# Patient Record
Sex: FEMALE | Race: BLACK OR AFRICAN AMERICAN | NOT HISPANIC OR LATINO | Employment: UNEMPLOYED | URBAN - METROPOLITAN AREA
[De-identification: names, ages, dates, MRNs, and addresses within clinical notes are randomized per-mention and may not be internally consistent; named-entity substitution may affect disease eponyms.]

---

## 2022-07-17 ENCOUNTER — APPOINTMENT (OUTPATIENT)
Dept: MRI IMAGING | Facility: HOSPITAL | Age: 27
End: 2022-07-17

## 2022-07-17 ENCOUNTER — APPOINTMENT (OUTPATIENT)
Dept: GENERAL RADIOLOGY | Facility: HOSPITAL | Age: 27
End: 2022-07-17

## 2022-07-17 ENCOUNTER — HOSPITAL ENCOUNTER (EMERGENCY)
Facility: HOSPITAL | Age: 27
Discharge: HOME | End: 2022-07-18
Attending: STUDENT IN AN ORGANIZED HEALTH CARE EDUCATION/TRAINING PROGRAM | Admitting: STUDENT IN AN ORGANIZED HEALTH CARE EDUCATION/TRAINING PROGRAM

## 2022-07-17 DIAGNOSIS — S39.012A STRAIN OF LUMBAR PARASPINOUS MUSCLE, INITIAL ENCOUNTER: ICD-10-CM

## 2022-07-17 DIAGNOSIS — M79.604 PAIN OF RIGHT LOWER EXTREMITY: ICD-10-CM

## 2022-07-17 DIAGNOSIS — M51.16 LUMBAR DISC DISEASE WITH RADICULOPATHY: Primary | ICD-10-CM

## 2022-07-17 DIAGNOSIS — S16.1XXA STRAIN OF NECK MUSCLE, INITIAL ENCOUNTER: ICD-10-CM

## 2022-07-17 PROCEDURE — 73502 X-RAY EXAM HIP UNI 2-3 VIEWS: CPT

## 2022-07-17 PROCEDURE — 72146 MRI CHEST SPINE W/O DYE: CPT

## 2022-07-17 PROCEDURE — 72141 MRI NECK SPINE W/O DYE: CPT

## 2022-07-17 PROCEDURE — 81025 URINE PREGNANCY TEST: CPT | Performed by: STUDENT IN AN ORGANIZED HEALTH CARE EDUCATION/TRAINING PROGRAM

## 2022-07-17 PROCEDURE — 72148 MRI LUMBAR SPINE W/O DYE: CPT

## 2022-07-17 PROCEDURE — 99284 EMERGENCY DEPT VISIT MOD MDM: CPT

## 2022-07-17 RX ORDER — METHOCARBAMOL 750 MG/1
750 TABLET, FILM COATED ORAL ONCE
Status: COMPLETED | OUTPATIENT
Start: 2022-07-17 | End: 2022-07-17

## 2022-07-17 RX ORDER — ACETAMINOPHEN 500 MG
1000 TABLET ORAL ONCE
Status: COMPLETED | OUTPATIENT
Start: 2022-07-17 | End: 2022-07-17

## 2022-07-17 RX ORDER — OXYCODONE HYDROCHLORIDE 5 MG/1
5 TABLET ORAL ONCE
Status: COMPLETED | OUTPATIENT
Start: 2022-07-17 | End: 2022-07-17

## 2022-07-17 RX ORDER — IBUPROFEN 800 MG/1
800 TABLET ORAL ONCE
Status: COMPLETED | OUTPATIENT
Start: 2022-07-17 | End: 2022-07-17

## 2022-07-17 RX ADMIN — ACETAMINOPHEN 1000 MG: 500 TABLET ORAL at 22:21

## 2022-07-17 RX ADMIN — IBUPROFEN 800 MG: 800 TABLET, FILM COATED ORAL at 22:21

## 2022-07-17 RX ADMIN — OXYCODONE 5 MG: 5 TABLET ORAL at 22:21

## 2022-07-17 RX ADMIN — METHOCARBAMOL 750 MG: 750 TABLET ORAL at 22:21

## 2022-07-18 VITALS
WEIGHT: 290 LBS | RESPIRATION RATE: 16 BRPM | DIASTOLIC BLOOD PRESSURE: 73 MMHG | HEART RATE: 66 BPM | SYSTOLIC BLOOD PRESSURE: 123 MMHG | TEMPERATURE: 97.3 F | OXYGEN SATURATION: 95 % | BODY MASS INDEX: 46.61 KG/M2 | HEIGHT: 66 IN

## 2022-07-18 LAB
B-HCG UR QL: NEGATIVE
EXPIRATION DATE: NORMAL
INTERNAL NEGATIVE CONTROL: NORMAL
INTERNAL POSITIVE CONTROL: NORMAL
Lab: NORMAL

## 2022-07-18 RX ORDER — GABAPENTIN 300 MG/1
300 CAPSULE ORAL 3 TIMES DAILY
Qty: 6 CAPSULE | Refills: 0 | Status: SHIPPED | OUTPATIENT
Start: 2022-07-18 | End: 2022-07-25

## 2022-07-18 RX ORDER — METHOCARBAMOL 750 MG/1
750 TABLET, FILM COATED ORAL 3 TIMES DAILY
Qty: 9 TABLET | Refills: 0 | Status: SHIPPED | OUTPATIENT
Start: 2022-07-18 | End: 2022-07-21

## 2022-07-18 RX ORDER — IBUPROFEN 600 MG/1
600 TABLET ORAL EVERY 6 HOURS PRN
Qty: 20 TABLET | Refills: 0 | Status: SHIPPED | OUTPATIENT
Start: 2022-07-18 | End: 2022-07-25

## 2022-07-18 RX ORDER — LIDOCAINE 50 MG/G
1 PATCH TOPICAL EVERY 24 HOURS
Qty: 6 PATCH | Refills: 0 | Status: SHIPPED | OUTPATIENT
Start: 2022-07-18 | End: 2022-07-25

## 2022-07-18 RX ORDER — METHYLPREDNISOLONE 4 MG/1
TABLET ORAL
Qty: 21 TABLET | Refills: 0 | Status: SHIPPED | OUTPATIENT
Start: 2022-07-18 | End: 2022-07-25

## 2022-07-18 NOTE — ED PROVIDER NOTES
EMERGENCY DEPARTMENT ENCOUNTER    Pt Name: Shonetta Davis  MRN: [id number]  Pt :   1995  Room Number:    Date of encounter:  2022  PCP: Provider, No Known  ED Provider: Luis Payne MD    Historian: Patient      HPI:  Chief Complaint: Back pain, leg pain, intermittent foot numbness        Context: Shonetta Davis is a 27-year-old female with obesity and without past medical history but also does not go to the doctor who presents because of 1 month of severe lumbar back pain radiating to her right posterior leg and knee.  She perceives it as pain being worse in the knee and is made significantly worse with movement is better with lying flat and gets worse with sitting.  She has not had numbness in the upper leg but has started having episodes of numbness in her foot and ankle particularly over the dorsum of the foot.  She denies fevers or IV drug use.  Denies any difficulty with urination or defecation.  Denies saddle anesthesia and the numbness is only in her foot.  She is also been having episodes of right neck pain and right arm numbness.  She knows of no trauma or injuries.  Has not found anything that makes her pain better.  No other complaints at this time.     PAST MEDICAL HISTORY  No past medical history on file.      PAST SURGICAL HISTORY  No past surgical history on file.      FAMILY HISTORY  No family history on file.      SOCIAL HISTORY  Social History     Socioeconomic History   • Marital status: Single         ALLERGIES  Patient has no known allergies.        REVIEW OF SYSTEMS  Review of Systems       All systems reviewed and negative except for those discussed in HPI.       PHYSICAL EXAM    I have reviewed the triage vital signs and nursing notes.    ED Triage Vitals [229]   Temp Heart Rate Resp BP SpO2   97.3 °F (36.3 °C) 103 16 (!) 144/110 98 %      Temp src Heart Rate Source Patient Position BP Location FiO2 (%)   Tympanic -- -- -- --       Physical  Exam  GENERAL:   Appears in obvious pain  HENT: Nares patent.  Full range of motion of the C-spine.  EYES: No scleral icterus.  CV: Regular rhythm, regular rate.  RESPIRATORY: Normal effort.  No audible wheezes, rales or rhonchi.  ABDOMEN: Soft, nontender  MUSCULOSKELETAL: No deformities.  Tenderness to palpation over the right lumbar paraspinal areas as well as midline in the lumbar area.  No saddle anesthesia, neurovascularly intact, positive straight leg raise on the left but not on the right, no knee pain with range of motion only pain in the back.  NEURO: Alert, moves all extremities, follows commands.  SKIN: Warm, dry, no rash visualized.        LAB RESULTS  Recent Results (from the past 24 hour(s))   POC Urine Pregnancy    Collection Time: 07/18/22  1:27 AM    Specimen: Urine   Result Value Ref Range    HCG, Urine, QL Negative Negative    Lot Number [id number]     Internal Positive Control Passed Positive, Passed    Internal Negative Control Passed Negative, Passed    Expiration Date 09/30/23        If labs were ordered, I independently reviewed the results.        RADIOLOGY  MRI Cervical Spine Without Contrast    Result Date: 7/18/2022  EXAMINATION: MRI THORACIC SPINE WO CONTRAST, MRI LUMBAR SPINE WO CONTRAST, MRI CERVICAL SPINE WO CONTRAST DATE: 7/17/2022 10:51 PM  INDICATION: Neck back and low back pain radiating to bilat lower extremities w increased severity to rt anterior thigh.  COMPARISON: None available. TECHNIQUE: Sagittal and axial noncontrast images of the cervical spine were obtained in the usual manner. FINDINGS: Cord and epidural space: No cerebellar tonsillar ectopia. Cervical cord is normal in size and signal. Vertebral column: Straightening of usual lordosis. Vertebral body heights are maintained. No concerning marrow signal. C2-C3: No significant central canal or foraminal narrowing. C3-C4: No significant central canal or foraminal narrowing. C4-C5: No significant central canal or foraminal  narrowing. C5-C6: No significant central canal or foraminal narrowing. C6-C7: No significant central canal or foraminal narrowing. C7-T1: No significant central canal or foraminal narrowing. Soft tissues: Cervical soft tissues are unremarkable. TECHNIQUE: Sagittal and axial noncontrast images of the thoracic spine were obtained in the usual manner. FINDINGS: Cord and epidural space: Thoracic cord is normal in size and signal. Vertebral column: Spinal alignment is anatomic. Vertebral body heights are maintained. No concerning marrow signal. T1-T2: There is preserved disc height. No focal disc protrusion or significant stenosis. T2-T3: There is preserved disc height. No focal disc protrusion or significant stenosis. T3-T4: There is preserved disc height. No focal disc protrusion or significant stenosis. T4-T5: There is preserved disc height. No focal disc protrusion or significant stenosis. T5-T6: There is preserved disc height. No focal disc protrusion or significant stenosis. T6-T7: There is preserved disc height. No focal disc protrusion or significant stenosis. T7-T8: There is preserved disc height. No focal disc protrusion or significant stenosis. T8-T9: There is preserved disc height. No focal disc protrusion or significant stenosis. T9-T10: There is preserved disc height. No focal disc protrusion or significant stenosis. T10-T11: There is preserved disc height. No focal disc protrusion or significant stenosis. T11-T12: There is preserved disc height. No focal disc protrusion or significant stenosis. Soft tissues: Imaged portions of the chest and upper abdomen are unremarkable. TECHNIQUE: Sagittal and axial noncontrast images of the lumbar spine were obtained in the usual manner. FINDINGS: Cord and epidural space: Conus terminates at L2. Nerve roots of the cauda equina are normal. Vertebral column: Spinal alignment is anatomic. There are five lumbar type vertebral bodies. Vertebral body heights are maintained   T1 hypointensity and STIR hyperintensity within the endplates about the L5-S1 disc space, most conspicuously involving the L5 inferior endplate; this most likely represents degenerative marrow changes.  Imaged portions of the sacrum and sacroiliac joints are within normal limits. T12-L1:  No significant central canal or foraminal narrowing. L1-L2:  No significant central canal or foraminal narrowing. L2-L3:  No significant central canal or foraminal narrowing. L3-L4:  No significant central canal or foraminal narrowing. L4-L5: No significant central canal or foraminal narrowing. L5-S1: Mild disc desiccation. Tiny central disc extrusion with inferior migration. The extruded disc extends 4 mm inferiorly beyond the superior endplate of S1. No significant spinal canal or foraminal narrowing. No significant central canal or foraminal  narrowing. Soft tissues: Imaged abdominal and retroperitoneal soft tissues are unremarkable.     MRI of the cervical spine:  1.  Normal MRI of the cervical spine.  No focal disc protrusion or significant stenosis. 2.  No cord compression or cord signal abnormality.   MRI of the thoracic spine:  1.  Normal MRI of the thoracic spine.  No focal disc protrusion or significant stenosis. 2.  No cord compression or cord signal abnormality.   MRI of the lumbar spine:  1.  Mild degenerative disc disease at L5-S1 mild disc desiccation and a tiny central extrusion without significant spinal canal or foraminal narrowing. 2.  Mild marrow signal abnormality about the L5-S1 disc space is most likely degenerative, however, very early discitis osteomyelitis could have a similar appearance. If there is further clinical concern for infection, suggest follow-up MRI of the lumbar  spine with and without contrast in 6-8 weeks. 3.  The remainder of the lumbar spine essentially normal. 4.  No compression of the cauda equina.   Electronically signed by:  Brian Barrera DO  7/17/2022 10:12 PM Mountain Time    MRI  Thoracic Spine Without Contrast    Result Date: 7/18/2022  EXAMINATION: MRI THORACIC SPINE WO CONTRAST, MRI LUMBAR SPINE WO CONTRAST, MRI CERVICAL SPINE WO CONTRAST DATE: 7/17/2022 10:51 PM  INDICATION: Neck back and low back pain radiating to bilat lower extremities w increased severity to rt anterior thigh.  COMPARISON: None available. TECHNIQUE: Sagittal and axial noncontrast images of the cervical spine were obtained in the usual manner. FINDINGS: Cord and epidural space: No cerebellar tonsillar ectopia. Cervical cord is normal in size and signal. Vertebral column: Straightening of usual lordosis. Vertebral body heights are maintained. No concerning marrow signal. C2-C3: No significant central canal or foraminal narrowing. C3-C4: No significant central canal or foraminal narrowing. C4-C5: No significant central canal or foraminal narrowing. C5-C6: No significant central canal or foraminal narrowing. C6-C7: No significant central canal or foraminal narrowing. C7-T1: No significant central canal or foraminal narrowing. Soft tissues: Cervical soft tissues are unremarkable. TECHNIQUE: Sagittal and axial noncontrast images of the thoracic spine were obtained in the usual manner. FINDINGS: Cord and epidural space: Thoracic cord is normal in size and signal. Vertebral column: Spinal alignment is anatomic. Vertebral body heights are maintained. No concerning marrow signal. T1-T2: There is preserved disc height. No focal disc protrusion or significant stenosis. T2-T3: There is preserved disc height. No focal disc protrusion or significant stenosis. T3-T4: There is preserved disc height. No focal disc protrusion or significant stenosis. T4-T5: There is preserved disc height. No focal disc protrusion or significant stenosis. T5-T6: There is preserved disc height. No focal disc protrusion or significant stenosis. T6-T7: There is preserved disc height. No focal disc protrusion or significant stenosis. T7-T8: There is  preserved disc height. No focal disc protrusion or significant stenosis. T8-T9: There is preserved disc height. No focal disc protrusion or significant stenosis. T9-T10: There is preserved disc height. No focal disc protrusion or significant stenosis. T10-T11: There is preserved disc height. No focal disc protrusion or significant stenosis. T11-T12: There is preserved disc height. No focal disc protrusion or significant stenosis. Soft tissues: Imaged portions of the chest and upper abdomen are unremarkable. TECHNIQUE: Sagittal and axial noncontrast images of the lumbar spine were obtained in the usual manner. FINDINGS: Cord and epidural space: Conus terminates at L2. Nerve roots of the cauda equina are normal. Vertebral column: Spinal alignment is anatomic. There are five lumbar type vertebral bodies. Vertebral body heights are maintained  T1 hypointensity and STIR hyperintensity within the endplates about the L5-S1 disc space, most conspicuously involving the L5 inferior endplate; this most likely represents degenerative marrow changes.  Imaged portions of the sacrum and sacroiliac joints are within normal limits. T12-L1:  No significant central canal or foraminal narrowing. L1-L2:  No significant central canal or foraminal narrowing. L2-L3:  No significant central canal or foraminal narrowing. L3-L4:  No significant central canal or foraminal narrowing. L4-L5: No significant central canal or foraminal narrowing. L5-S1: Mild disc desiccation. Tiny central disc extrusion with inferior migration. The extruded disc extends 4 mm inferiorly beyond the superior endplate of S1. No significant spinal canal or foraminal narrowing. No significant central canal or foraminal  narrowing. Soft tissues: Imaged abdominal and retroperitoneal soft tissues are unremarkable.     MRI of the cervical spine:  1.  Normal MRI of the cervical spine.  No focal disc protrusion or significant stenosis. 2.  No cord compression or cord signal  abnormality.   MRI of the thoracic spine:  1.  Normal MRI of the thoracic spine.  No focal disc protrusion or significant stenosis. 2.  No cord compression or cord signal abnormality.   MRI of the lumbar spine:  1.  Mild degenerative disc disease at L5-S1 mild disc desiccation and a tiny central extrusion without significant spinal canal or foraminal narrowing. 2.  Mild marrow signal abnormality about the L5-S1 disc space is most likely degenerative, however, very early discitis osteomyelitis could have a similar appearance. If there is further clinical concern for infection, suggest follow-up MRI of the lumbar  spine with and without contrast in 6-8 weeks. 3.  The remainder of the lumbar spine essentially normal. 4.  No compression of the cauda equina.   Electronically signed by:  Brian Barrera DO  7/17/2022 10:12 PM Mountain Time    MRI Lumbar Spine Without Contrast    Result Date: 7/18/2022  EXAMINATION: MRI THORACIC SPINE WO CONTRAST, MRI LUMBAR SPINE WO CONTRAST, MRI CERVICAL SPINE WO CONTRAST DATE: 7/17/2022 10:51 PM  INDICATION: Neck back and low back pain radiating to bilat lower extremities w increased severity to rt anterior thigh.  COMPARISON: None available. TECHNIQUE: Sagittal and axial noncontrast images of the cervical spine were obtained in the usual manner. FINDINGS: Cord and epidural space: No cerebellar tonsillar ectopia. Cervical cord is normal in size and signal. Vertebral column: Straightening of usual lordosis. Vertebral body heights are maintained. No concerning marrow signal. C2-C3: No significant central canal or foraminal narrowing. C3-C4: No significant central canal or foraminal narrowing. C4-C5: No significant central canal or foraminal narrowing. C5-C6: No significant central canal or foraminal narrowing. C6-C7: No significant central canal or foraminal narrowing. C7-T1: No significant central canal or foraminal narrowing. Soft tissues: Cervical soft tissues are unremarkable. TECHNIQUE:  Sagittal and axial noncontrast images of the thoracic spine were obtained in the usual manner. FINDINGS: Cord and epidural space: Thoracic cord is normal in size and signal. Vertebral column: Spinal alignment is anatomic. Vertebral body heights are maintained. No concerning marrow signal. T1-T2: There is preserved disc height. No focal disc protrusion or significant stenosis. T2-T3: There is preserved disc height. No focal disc protrusion or significant stenosis. T3-T4: There is preserved disc height. No focal disc protrusion or significant stenosis. T4-T5: There is preserved disc height. No focal disc protrusion or significant stenosis. T5-T6: There is preserved disc height. No focal disc protrusion or significant stenosis. T6-T7: There is preserved disc height. No focal disc protrusion or significant stenosis. T7-T8: There is preserved disc height. No focal disc protrusion or significant stenosis. T8-T9: There is preserved disc height. No focal disc protrusion or significant stenosis. T9-T10: There is preserved disc height. No focal disc protrusion or significant stenosis. T10-T11: There is preserved disc height. No focal disc protrusion or significant stenosis. T11-T12: There is preserved disc height. No focal disc protrusion or significant stenosis. Soft tissues: Imaged portions of the chest and upper abdomen are unremarkable. TECHNIQUE: Sagittal and axial noncontrast images of the lumbar spine were obtained in the usual manner. FINDINGS: Cord and epidural space: Conus terminates at L2. Nerve roots of the cauda equina are normal. Vertebral column: Spinal alignment is anatomic. There are five lumbar type vertebral bodies. Vertebral body heights are maintained  T1 hypointensity and STIR hyperintensity within the endplates about the L5-S1 disc space, most conspicuously involving the L5 inferior endplate; this most likely represents degenerative marrow changes.  Imaged portions of the sacrum and sacroiliac joints  are within normal limits. T12-L1:  No significant central canal or foraminal narrowing. L1-L2:  No significant central canal or foraminal narrowing. L2-L3:  No significant central canal or foraminal narrowing. L3-L4:  No significant central canal or foraminal narrowing. L4-L5: No significant central canal or foraminal narrowing. L5-S1: Mild disc desiccation. Tiny central disc extrusion with inferior migration. The extruded disc extends 4 mm inferiorly beyond the superior endplate of S1. No significant spinal canal or foraminal narrowing. No significant central canal or foraminal  narrowing. Soft tissues: Imaged abdominal and retroperitoneal soft tissues are unremarkable.     MRI of the cervical spine:  1.  Normal MRI of the cervical spine.  No focal disc protrusion or significant stenosis. 2.  No cord compression or cord signal abnormality.   MRI of the thoracic spine:  1.  Normal MRI of the thoracic spine.  No focal disc protrusion or significant stenosis. 2.  No cord compression or cord signal abnormality.   MRI of the lumbar spine:  1.  Mild degenerative disc disease at L5-S1 mild disc desiccation and a tiny central extrusion without significant spinal canal or foraminal narrowing. 2.  Mild marrow signal abnormality about the L5-S1 disc space is most likely degenerative, however, very early discitis osteomyelitis could have a similar appearance. If there is further clinical concern for infection, suggest follow-up MRI of the lumbar  spine with and without contrast in 6-8 weeks. 3.  The remainder of the lumbar spine essentially normal. 4.  No compression of the cauda equina.   Electronically signed by:  Brian Barrera DO  7/17/2022 10:12 PM Mountain Time    XR Hip With or Without Pelvis 2 - 3 View Right    Result Date: 7/17/2022  EXAMINATION: XR HIP W OR WO PELVIS 2-3 VIEW RIGHT  DATE OF EXAM: 7/17/2022 10:15 PM HISTORY: Right knee, hip, back pain COMPARISON: None. FINDINGS: AP pelvis and additional lateral view of  the right hip: No acute osseous abnormality. Joint spaces preserved. No radiopaque foreign body.     1.  Normal.  Electronically signed by:  Ce Amador M.D.  7/17/2022 9:02 PM Mountain Time      I ordered and reviewed the above noted radiographic studies.      I viewed images of x-ray of the right hip which shows no abnormalities, MRI of the C, T, L-spine which shows degenerative change of the lumbar spine but no other abnormalities that I can appreciate.    See radiologist's dictation for official interpretation.        PROCEDURES    Procedures    No orders to display       MEDICATIONS GIVEN IN ER    Medications   oxyCODONE (ROXICODONE) immediate release tablet 5 mg (5 mg Oral Given 7/17/22 2221)   acetaminophen (TYLENOL) tablet 1,000 mg (1,000 mg Oral Given 7/17/22 2221)   ibuprofen (ADVIL,MOTRIN) tablet 800 mg (800 mg Oral Given 7/17/22 2221)   methocarbamol (ROBAXIN) tablet 750 mg (750 mg Oral Given 7/17/22 2221)         PROGRESS, DATA ANALYSIS, CONSULTS, AND MEDICAL DECISION MAKING    All labs have been independently reviewed by me.  All radiology studies have been reviewed by me and the radiologist dictating the report.   EKG's have been independently viewed and interpreted by me.            ED Course as of 07/18/22 0202   Sun Jul 17, 2022 2159 In summary this a very nice 27-year-old female with obesity and without past medical history but also does not go to the doctor who presents because of 1 month of severe lumbar back pain radiating to her right posterior leg and knee.  She perceives it as pain being worse in the knee and is made significantly worse with movement is better with lying flat and gets worse with sitting.  She has not had numbness in the upper leg but has started having episodes of numbness in her foot and ankle particularly over the dorsum of the foot.  She denies fevers or IV drug use.  Denies any difficulty with urination or defecation.  Denies saddle anesthesia and the numbness is  only in her foot.  She is also been having episodes of right neck pain and right arm numbness.  She knows of no trauma or injuries.  Has not found anything that makes her pain better.  No other complaints at this time. [CC]   2200 She arrived awake and alert mildly hypertensive and tachycardic but endorses whitecoat syndrome we will continue to monitor vitals. [CC]   2200 She is in obvious pain has tenderness to palpation over the lumbar spine, right paraspinal area.  Positive straight leg raise on the left but not right.  No saddle anesthesia, normal reflexes and no pain in the knee with range of motion of the knee.  She is in obvious pain and does not like going to the doctor or hospitals and it must of taken severe pain to bring her here.  Treating her pain with acetaminophen, ibuprofen, oxycodone, methocarbamol.  She would like to avoid an IV if possible though I implored her to establish with a primary care physician and will help her with that.  Because of her neurologic symptoms obtaining MRIs of the spine as well as an x-ray of the hip to rule out occult fracture, slipped capital femoral epiphysis or other abnormalities.  Will reevaluate pending initial work-up. [CC]      ED Course User Index  [CC] Luis Payne MD       Pregnancy test is negative.  Pain is improved somewhat with pain medication but she still uncomfortable.  MRIs of the C, T, L-spine reveal no abnormalities in the C or T-spine but L-spine does show multiple degenerative changes bulging disks and hyperintensity at L5-S1 likely degenerative but read as possible early discitis and recommend 6-week follow-up.  I want to make sure that she is followed up so I have messaged neuro/spine surgery who if so they will make sure that happens.  No evidence of cord compression or other concerning findings.  She is afebrile.  I think she can safely discharged with symptomatic measures and with spine surgery follow-up.  She was provided multiple  pain medications, muscle relaxers, corticosteroids and even Lidoderm patches.  Counseled on avoiding heavy lifting or straining but maintaining light activity.  She will follow-up with spine surgery.  Counseled on strict return precautions verbally expressed understanding of these.      AS OF 02:02 EDT VITALS:    BP - 123/73  HR - 66  TEMP - 97.3 °F (36.3 °C) (Tympanic)  O2 SATS - 95%                  DIAGNOSIS  Final diagnoses:   Lumbar disc disease with radiculopathy   Pain of right lower extremity   Strain of neck muscle, initial encounter   Strain of lumbar paraspinous muscle, initial encounter         DISPOSITION  DISCHARGE    Patient discharged in stable condition.    Reviewed implications of results, diagnosis, meds, responsibility to follow up, warning signs and symptoms of possible worsening, potential complications and reasons to return to ER.    Patient/Family voiced understanding of above instructions.    Discussed plan for discharge, as there is no emergent indication for admission.  Pt/family is agreeable and understands need for follow up and possible repeat testing.  Pt/family is aware that discharge does not mean that nothing is wrong but that it indicates no emergency is currently present that requires admission and they must continue care with follow-up as given below or with a physician of their choice.     FOLLOW-UP  PATIENT CONNECTION - [place] [address]  [phone]  Call   To establish with a primary care provider.    Sindi Medina, PA-C  [address] [facility] [address] [place] [address]  [phone]    Call   To arrange spine surgery follow-up.         Medication List      New Prescriptions    gabapentin 300 MG capsule  Commonly known as: NEURONTIN  Take 1 capsule by mouth 3 (Three) Times a Day for 2 days.     ibuprofen 600 MG tablet  Commonly known as: ADVIL,MOTRIN  Take 1 tablet by mouth Every 6 (Six) Hours As Needed for Mild Pain .     lidocaine 5  %  Commonly known as: LIDODERM  Place 1 patch on the skin as directed by provider Daily. Remove & Discard patch within 12 hours or as directed by MD     methocarbamol 750 MG tablet  Commonly known as: ROBAXIN  Take 1 tablet by mouth 3 (Three) Times a Day for 3 days.     methylPREDNISolone 4 MG dose pack  Commonly known as: MEDROL  Take as directed on package instructions.           Where to Get Your Medications      These medications were sent to [address] - [address] [facility] [phone] [place] [id number] [address] [address]    Phone: [phone]   · gabapentin 300 MG capsule  · ibuprofen 600 MG tablet  · lidocaine 5 %  · methocarbamol 750 MG tablet  · methylPREDNISolone 4 MG dose pack                    Luis Payne MD  07/18/22 0209

## 2022-07-18 NOTE — DISCHARGE INSTRUCTIONS
Try the provided medications for pain and follow-up with the provided spine surgeon.  While today's work-up was reassuring should you develop any weakness, fevers, difficulty urinating or defecating or any other concerning symptoms please return to the ED or seek other medical care.  Try to avoid heavy lifting or straining but encourage light activity.

## 2022-07-19 ENCOUNTER — OFFICE VISIT (OUTPATIENT)
Dept: NEUROSURGERY | Facility: CLINIC | Age: 27
End: 2022-07-19

## 2022-07-19 VITALS — HEIGHT: 66 IN | BODY MASS INDEX: 46.61 KG/M2 | WEIGHT: 290 LBS

## 2022-07-19 DIAGNOSIS — R20.2 NUMBNESS AND TINGLING OF FOOT: Primary | ICD-10-CM

## 2022-07-19 DIAGNOSIS — M51.36 DDD (DEGENERATIVE DISC DISEASE), LUMBAR: ICD-10-CM

## 2022-07-19 DIAGNOSIS — Z76.89 ENCOUNTER TO ESTABLISH CARE: Primary | ICD-10-CM

## 2022-07-19 DIAGNOSIS — R20.0 NUMBNESS AND TINGLING OF FOOT: Primary | ICD-10-CM

## 2022-07-19 PROBLEM — M51.369 DDD (DEGENERATIVE DISC DISEASE), LUMBAR: Status: ACTIVE | Noted: 2022-07-19

## 2022-07-19 PROCEDURE — 99204 OFFICE O/P NEW MOD 45 MIN: CPT | Performed by: PHYSICIAN ASSISTANT

## 2022-07-19 NOTE — PROGRESS NOTES
Shonetta Davis   1995   [id number]     07/19/2022     Telemedicine: [facility]  Location of Provider: Office  Type of Service: consult via telemedicine  Any physical exam was assisted by the patient.  All communications with the patient (verbal, audiovisual and written) were documented in the patient's medical record per documentation standards.  Mode of transmission: Telemedicine via 2-way interactive A/V telecommunication.  Basis for telemedicine: COVID-19    You have chosen to receive care through a telephone visit. Do you consent to use a telephone visit for your medical care today? yes    CC: back and right thigh pain    HPI:  27-year-old who presented to the emergency room on 7/17/2022 for back pain, neck pain and pain into both thighs, worse on the right.  She had cervical, thoracic and lumbar MRI scans and we are having a telemedicine visit today to discuss.  She endorses numbness in her foot especially when she is driving.  Her back pain is worse going from a sitting to standing position.    Past medical history, allergies, medications, surgical history, social history, family history reviewed and updated.    Social History     Tobacco Use   Smoking Status Former Smoker   Smokeless Tobacco Never Used        Body mass index is 46.83 kg/m².       Physical examination:  The patient sounds well on the phone, no cough, SOB or wheezing is noted.    Review of new studies:  Cervical and thoracic MRI is within normal limits.  Lumbar MRI scan shows a degenerative disc at L5-S1, there is no canal or nerve root compression.    Assessment/Plan:  Diagnoses and all orders for this visit:    1. Numbness and tingling of foot (Primary)    2. DDD (degenerative disc disease), lumbar    Patient is a degenerative disc at L5-S1 which is consistent with her complaints of back pain.  There is no evidence of nerve root compression to explain her right foot numbness.  Patient is in the process of signing up for insurance,  she does not have a primary care physician and we need to make referrals for a PCP.    This was an audio and video enabled telemedicine encounter. This visit has been rescheduled as a phone visit to comply with patient safety concerns in accordance with CDC recommendations.     Total time of discussion was 30 minutes.       Sindi Medina, PAC    Provider, No Known

## 2022-07-25 ENCOUNTER — OFFICE VISIT (OUTPATIENT)
Dept: FAMILY MEDICINE CLINIC | Facility: CLINIC | Age: 27
End: 2022-07-25

## 2022-07-25 ENCOUNTER — LAB (OUTPATIENT)
Dept: LAB | Facility: HOSPITAL | Age: 27
End: 2022-07-25

## 2022-07-25 ENCOUNTER — PATIENT ROUNDING (BHMG ONLY) (OUTPATIENT)
Dept: FAMILY MEDICINE CLINIC | Facility: CLINIC | Age: 27
End: 2022-07-25

## 2022-07-25 ENCOUNTER — HOSPITAL ENCOUNTER (OUTPATIENT)
Dept: GENERAL RADIOLOGY | Facility: HOSPITAL | Age: 27
Discharge: HOME | End: 2022-07-25

## 2022-07-25 VITALS
OXYGEN SATURATION: 98 % | BODY MASS INDEX: 45.48 KG/M2 | WEIGHT: 283 LBS | SYSTOLIC BLOOD PRESSURE: 132 MMHG | DIASTOLIC BLOOD PRESSURE: 66 MMHG | HEIGHT: 66 IN | HEART RATE: 88 BPM

## 2022-07-25 DIAGNOSIS — R93.89 ABNORMAL MRI: ICD-10-CM

## 2022-07-25 DIAGNOSIS — Z00.00 PREVENTATIVE HEALTH CARE: ICD-10-CM

## 2022-07-25 DIAGNOSIS — M25.561 ACUTE PAIN OF RIGHT KNEE: ICD-10-CM

## 2022-07-25 DIAGNOSIS — G89.29 CHRONIC LOW BACK PAIN WITHOUT SCIATICA, UNSPECIFIED BACK PAIN LATERALITY: ICD-10-CM

## 2022-07-25 DIAGNOSIS — M54.50 CHRONIC LOW BACK PAIN WITHOUT SCIATICA, UNSPECIFIED BACK PAIN LATERALITY: ICD-10-CM

## 2022-07-25 DIAGNOSIS — R29.898 TRANSIENT WEAKNESS OF RIGHT LOWER EXTREMITY: ICD-10-CM

## 2022-07-25 DIAGNOSIS — M25.561 ACUTE PAIN OF RIGHT KNEE: Primary | ICD-10-CM

## 2022-07-25 LAB
ALBUMIN SERPL-MCNC: 4 G/DL (ref 3.5–5.2)
ALBUMIN/GLOB SERPL: 1.3 G/DL
ALP SERPL-CCNC: 76 U/L (ref 39–117)
ALT SERPL W P-5'-P-CCNC: 11 U/L (ref 1–33)
ANION GAP SERPL CALCULATED.3IONS-SCNC: 8.8 MMOL/L (ref 5–15)
AST SERPL-CCNC: 17 U/L (ref 1–32)
BASOPHILS # BLD AUTO: 0.05 10*3/MM3 (ref 0–0.2)
BASOPHILS NFR BLD AUTO: 0.5 % (ref 0–1.5)
BILIRUB SERPL-MCNC: <0.2 MG/DL (ref 0–1.2)
BUN SERPL-MCNC: 12 MG/DL (ref 6–20)
BUN/CREAT SERPL: 14.8 (ref 7–25)
CALCIUM SPEC-SCNC: 8.9 MG/DL (ref 8.6–10.5)
CHLORIDE SERPL-SCNC: 108 MMOL/L (ref 98–107)
CHOLEST SERPL-MCNC: 165 MG/DL (ref 0–200)
CO2 SERPL-SCNC: 26.2 MMOL/L (ref 22–29)
CREAT SERPL-MCNC: 0.81 MG/DL (ref 0.57–1)
CRP SERPL-MCNC: 0.47 MG/DL (ref 0–0.5)
DEPRECATED RDW RBC AUTO: 45.5 FL (ref 37–54)
EGFRCR SERPLBLD CKD-EPI 2021: 101.5 ML/MIN/1.73
EOSINOPHIL # BLD AUTO: 0.04 10*3/MM3 (ref 0–0.4)
EOSINOPHIL NFR BLD AUTO: 0.4 % (ref 0.3–6.2)
ERYTHROCYTE [DISTWIDTH] IN BLOOD BY AUTOMATED COUNT: 16.1 % (ref 12.3–15.4)
ERYTHROCYTE [SEDIMENTATION RATE] IN BLOOD: 30 MM/HR (ref 0–20)
GLOBULIN UR ELPH-MCNC: 3 GM/DL
GLUCOSE SERPL-MCNC: 74 MG/DL (ref 65–99)
HBA1C MFR BLD: 5.8 % (ref 4.8–5.6)
HCT VFR BLD AUTO: 39.3 % (ref 34–46.6)
HDLC SERPL-MCNC: 58 MG/DL (ref 40–60)
HGB BLD-MCNC: 12.3 G/DL (ref 12–15.9)
IMM GRANULOCYTES # BLD AUTO: 0.03 10*3/MM3 (ref 0–0.05)
IMM GRANULOCYTES NFR BLD AUTO: 0.3 % (ref 0–0.5)
LDLC SERPL CALC-MCNC: 79 MG/DL (ref 0–100)
LDLC/HDLC SERPL: 1.28 {RATIO}
LYMPHOCYTES # BLD AUTO: 4.24 10*3/MM3 (ref 0.7–3.1)
LYMPHOCYTES NFR BLD AUTO: 42.4 % (ref 19.6–45.3)
MCH RBC QN AUTO: 24.4 PG (ref 26.6–33)
MCHC RBC AUTO-ENTMCNC: 31.3 G/DL (ref 31.5–35.7)
MCV RBC AUTO: 78 FL (ref 79–97)
MONOCYTES # BLD AUTO: 0.73 10*3/MM3 (ref 0.1–0.9)
MONOCYTES NFR BLD AUTO: 7.3 % (ref 5–12)
NEUTROPHILS NFR BLD AUTO: 4.92 10*3/MM3 (ref 1.7–7)
NEUTROPHILS NFR BLD AUTO: 49.1 % (ref 42.7–76)
NRBC BLD AUTO-RTO: 0 /100 WBC (ref 0–0.2)
PLATELET # BLD AUTO: 282 10*3/MM3 (ref 140–450)
PMV BLD AUTO: 9.9 FL (ref 6–12)
POTASSIUM SERPL-SCNC: 3.9 MMOL/L (ref 3.5–5.2)
PROT SERPL-MCNC: 7 G/DL (ref 6–8.5)
RBC # BLD AUTO: 5.04 10*6/MM3 (ref 3.77–5.28)
SODIUM SERPL-SCNC: 143 MMOL/L (ref 136–145)
T4 FREE SERPL-MCNC: 1.29 NG/DL (ref 0.93–1.7)
TRIGL SERPL-MCNC: 165 MG/DL (ref 0–150)
TSH SERPL DL<=0.05 MIU/L-ACNC: 4.36 UIU/ML (ref 0.27–4.2)
VLDLC SERPL-MCNC: 28 MG/DL (ref 5–40)
WBC NRBC COR # BLD: 10.01 10*3/MM3 (ref 3.4–10.8)

## 2022-07-25 PROCEDURE — 82306 VITAMIN D 25 HYDROXY: CPT

## 2022-07-25 PROCEDURE — 80053 COMPREHEN METABOLIC PANEL: CPT

## 2022-07-25 PROCEDURE — 84443 ASSAY THYROID STIM HORMONE: CPT

## 2022-07-25 PROCEDURE — 86140 C-REACTIVE PROTEIN: CPT

## 2022-07-25 PROCEDURE — 80061 LIPID PANEL: CPT

## 2022-07-25 PROCEDURE — 84439 ASSAY OF FREE THYROXINE: CPT

## 2022-07-25 PROCEDURE — 99204 OFFICE O/P NEW MOD 45 MIN: CPT | Performed by: INTERNAL MEDICINE

## 2022-07-25 PROCEDURE — 85025 COMPLETE CBC W/AUTO DIFF WBC: CPT

## 2022-07-25 PROCEDURE — 36415 COLL VENOUS BLD VENIPUNCTURE: CPT

## 2022-07-25 PROCEDURE — 73560 X-RAY EXAM OF KNEE 1 OR 2: CPT

## 2022-07-25 PROCEDURE — 83036 HEMOGLOBIN GLYCOSYLATED A1C: CPT

## 2022-07-25 PROCEDURE — 85652 RBC SED RATE AUTOMATED: CPT

## 2022-07-25 RX ORDER — NAPROXEN 500 MG/1
500 TABLET ORAL 2 TIMES DAILY WITH MEALS
Qty: 14 TABLET | Refills: 0 | Status: SHIPPED | OUTPATIENT
Start: 2022-07-25 | End: 2022-08-01

## 2022-07-25 RX ORDER — CYCLOBENZAPRINE HCL 10 MG
10 TABLET ORAL NIGHTLY PRN
Qty: 14 TABLET | Refills: 0 | Status: SHIPPED | OUTPATIENT
Start: 2022-07-25 | End: 2022-08-08

## 2022-07-25 NOTE — PROGRESS NOTES
Chief Complaint   Patient presents with   • Establish Care   • Knee Pain     Right knee mainly, and left.    • Back Pain       HPI:  Shonetta Davis is a 27 y.o. female who presents today for establish care.  She reports worsening low back and knee pain for the last month.  She went to the ER last week due to worsening knee pain.  Denies any trauma or injury.    ROS:  Constitutional: no fevers, night sweats or unexplained weight loss  Eyes: no vision changes  ENT: no runny nose, ear pain, sore throat  Cardio: no chest pain, palpitations  Pulm: no shortness of breath, wheezing, or cough  GI: no abdominal pain or changes in bowel movements  : no difficulty urinating  MSK: no difficulty ambulating, no joint pain  Neuro: no weakness, dizziness or headache  Psych: no trouble sleeping  Endo: no change in appetite      History reviewed. No pertinent past medical history.   Family History   Problem Relation Age of Onset   • Arthritis Mother    • Hypertension Mother    • Diabetes Mother    • Heart disease Mother    • Cancer Father    • Thyroid disease Father       Social History     Socioeconomic History   • Marital status: Single   Tobacco Use   • Smoking status: Passive Smoke Exposure - Never Smoker   • Smokeless tobacco: Never Used   Substance and Sexual Activity   • Alcohol use: Yes     Comment: 3 times a week   • Drug use: Never   • Sexual activity: Yes     Partners: Male     Birth control/protection: Condom      No Known Allergies     There is no immunization history on file for this patient.     PE:  Vitals:    07/25/22 1104   BP: 132/66   Pulse: 88   SpO2: 98%      Body mass index is 45.71 kg/m².    Gen Appearance: NAD  HEENT: Normocephalic, PERRLA, no thyromegaly, trache midline  Heart: RRR, normal S1 and S2, no murmur  Lungs: CTA b/l, no wheezing, no crackles  Abdomen: Soft, non-tender, non-distended, no guarding and BSx4  MSK: Moves all extremities well, normal gait, no peripheral edema  Pulses: Palpable and  equal b/l  Lymph nodes: No palpable lymphadenopathy   Neuro: No focal deficits      Current Outpatient Medications   Medication Sig Dispense Refill   • cyclobenzaprine (FLEXERIL) 10 MG tablet Take 1 tablet by mouth At Night As Needed for Muscle Spasms for up to 14 days. 14 tablet 0   • naproxen (Naprosyn) 500 MG tablet Take 1 tablet by mouth 2 (Two) Times a Day With Meals for 7 days. 14 tablet 0     No current facility-administered medications for this visit.      Desiccated disc at L5-S1 and degenerative changes along with possible discitis versus osteomyelitis?  Evaluated by neurosurgery already.     Initial concern is knee pain.  Recommend checking x-ray of knee and refer to orthopedics to establish care.  Pain medicine as needed.  She has a scar on anterior knee but denies any prior surgery.  She is unsure where scar came from.    Recommend physical therapy for low back.  Checking lab work, specifically inflammatory markers due to possible discitis on MRI.  See back in 1 month for reassessment.  Call or return to clinic for any worsening symptoms or no improvement.    Diagnoses and all orders for this visit:    1. Acute pain of right knee (Primary)  -     XR Knee 1 or 2 View Bilateral; Future  -     naproxen (Naprosyn) 500 MG tablet; Take 1 tablet by mouth 2 (Two) Times a Day With Meals for 7 days.  Dispense: 14 tablet; Refill: 0  -     cyclobenzaprine (FLEXERIL) 10 MG tablet; Take 1 tablet by mouth At Night As Needed for Muscle Spasms for up to 14 days.  Dispense: 14 tablet; Refill: 0    2. Chronic low back pain without sciatica, unspecified back pain laterality  -     Sedimentation rate, automated; Future  -     C-reactive protein; Future  -     naproxen (Naprosyn) 500 MG tablet; Take 1 tablet by mouth 2 (Two) Times a Day With Meals for 7 days.  Dispense: 14 tablet; Refill: 0  -     cyclobenzaprine (FLEXERIL) 10 MG tablet; Take 1 tablet by mouth At Night As Needed for Muscle Spasms for up to 14 days.   Dispense: 14 tablet; Refill: 0    3. Transient weakness of right lower extremity    4. Preventative health care  -     CBC & Differential; Future  -     Comprehensive Metabolic Panel; Future  -     Hemoglobin A1c; Future  -     Lipid Panel; Future  -     TSH+Free T4; Future  -     Vitamin D 25 Hydroxy; Future  -     Urinalysis With Culture If Indicated - Urine, Clean Catch; Future  -     Sedimentation rate, automated; Future  -     C-reactive protein; Future    5. Abnormal MRI  -     Sedimentation rate, automated; Future  -     C-reactive protein; Future         No follow-ups on file.     Dictated Utilizing Dragon Dictation    Please note that portions of this note were completed with a voice recognition program.    Part of this note may be an electronic transcription/translation of spoken language to printed text using the Dragon Dictation System.

## 2022-07-26 LAB — 25(OH)D3 SERPL-MCNC: 11 NG/ML (ref 30–100)

## 2022-07-27 DIAGNOSIS — M54.41 MIDLINE LOW BACK PAIN WITH RIGHT-SIDED SCIATICA, UNSPECIFIED CHRONICITY: Primary | ICD-10-CM

## 2022-07-27 DIAGNOSIS — R70.0 ELEVATED SED RATE: ICD-10-CM

## 2022-07-27 DIAGNOSIS — M46.46 DISCITIS OF LUMBAR REGION: ICD-10-CM

## 2022-07-29 ENCOUNTER — HOSPITAL ENCOUNTER (OUTPATIENT)
Dept: MRI IMAGING | Facility: HOSPITAL | Age: 27
Discharge: HOME | End: 2022-07-29
Admitting: INTERNAL MEDICINE

## 2022-07-29 DIAGNOSIS — R70.0 ELEVATED SED RATE: ICD-10-CM

## 2022-07-29 DIAGNOSIS — M54.41 MIDLINE LOW BACK PAIN WITH RIGHT-SIDED SCIATICA, UNSPECIFIED CHRONICITY: ICD-10-CM

## 2022-07-29 DIAGNOSIS — M46.46 DISCITIS OF LUMBAR REGION: ICD-10-CM

## 2022-07-29 PROCEDURE — 0 GADOBENATE DIMEGLUMINE 529 MG/ML SOLUTION: Performed by: INTERNAL MEDICINE

## 2022-07-29 PROCEDURE — 72158 MRI LUMBAR SPINE W/O & W/DYE: CPT

## 2022-07-29 PROCEDURE — A9577 INJ MULTIHANCE: HCPCS | Performed by: INTERNAL MEDICINE

## 2022-07-29 RX ADMIN — GADOBENATE DIMEGLUMINE 20 ML: 529 INJECTION, SOLUTION INTRAVENOUS at 19:32

## 2022-08-16 ENCOUNTER — OFFICE VISIT (OUTPATIENT)
Dept: ORTHOPEDIC SURGERY | Facility: CLINIC | Age: 27
End: 2022-08-16

## 2022-08-16 VITALS
BODY MASS INDEX: 45 KG/M2 | WEIGHT: 280 LBS | DIASTOLIC BLOOD PRESSURE: 62 MMHG | SYSTOLIC BLOOD PRESSURE: 130 MMHG | HEIGHT: 66 IN

## 2022-08-16 DIAGNOSIS — R20.2 NUMBNESS AND TINGLING OF RIGHT LOWER EXTREMITY: ICD-10-CM

## 2022-08-16 DIAGNOSIS — R20.0 NUMBNESS AND TINGLING OF RIGHT LOWER EXTREMITY: ICD-10-CM

## 2022-08-16 DIAGNOSIS — M25.561 ACUTE PAIN OF RIGHT KNEE: Primary | ICD-10-CM

## 2022-08-16 PROCEDURE — 99204 OFFICE O/P NEW MOD 45 MIN: CPT | Performed by: ORTHOPAEDIC SURGERY

## 2022-08-16 NOTE — PROGRESS NOTES
[facility] Note        Subjective     Pain of the Right Knee      HPI    Shonetta Elaine Davis is a 27 y.o. female who presents with new problem of: right knee pain.  Onset: atraumatic and gradual in nature. The issue has been ongoing for 4 month(s). Pain is a 10/10 on the pain scale. Pain is described as aching and throbbing. Associated symptoms include pain and stiffness. The pain is worse with walking, standing, climbing stairs and working; pain medication and/or NSAID improve the pain. Previous treatments have included: NSAIDS.    I have reviewed the following portions of the patient's history:History of Present Illness and review of systems.         Patient is here today for evaluation of right knee pain.  This has been going on for several months.  This all began when her right foot went numb after a long car trip and she went to the emergency room.  She has had 2 lumbar spine MRIs.  She has had telephone visit with Shanae Medina.  Patient tells me that the main issue is leg pain and numbness.  Has not had any injury to the knee.  She says she has a lot of body pain.  She feels like the knee has stiffened up.  She feels a leaking sensation in the back of the knee.  Again no history of trauma.      History reviewed. No pertinent past medical history.   Past Surgical History:   Procedure Laterality Date   • TONSILLECTOMY AND ADENOIDECTOMY        Family History   Problem Relation Age of Onset   • Arthritis Mother    • Hypertension Mother    • Diabetes Mother    • Heart disease Mother    • Cancer Father    • Thyroid disease Father      Social History     Socioeconomic History   • Marital status: Single   Tobacco Use   • Smoking status: Passive Smoke Exposure - Never Smoker   • Smokeless tobacco: Never Used   Substance and Sexual Activity   • Alcohol use: Yes     Comment: 3 times a week   • Drug use: Never   • Sexual activity: Yes     Partners: Male     Birth control/protection: Condom     "  No current outpatient medications on file prior to visit.     No current facility-administered medications on file prior to visit.      No Known Allergies       Review of Systems   Constitutional: Negative.    HENT: Negative.    Eyes: Negative.    Respiratory: Negative.    Cardiovascular: Negative.    Gastrointestinal: Negative.    Endocrine: Negative.    Genitourinary: Negative.    Musculoskeletal: Positive for arthralgias.   Skin: Negative.    Allergic/Immunologic: Negative.    Neurological: Negative.    Hematological: Negative.    Psychiatric/Behavioral: Negative.         I reviewed the patient's chief complaint, history of present illness, review of systems, past medical history, surgical history, family history, social history, medications and allergy list.        Objective      Physical Exam  /62   Ht 167.6 cm (65.98\")   Wt 127 kg (280 lb)   BMI 45.22 kg/m²     Body mass index is 45.22 kg/m².    General  Mental Status - alert  General Appearance - cooperative, well groomed, not in acute distress  Orientation - Oriented X3  Build & Nutrition - well developed and well nourished  Posture - normal posture  Gait - normal gait       Ortho Exam  Patient has no effusion.  Mild medial lateral joint line tenderness.  Full range of motion.  The knee is stable to varus and valgus force at 0 and 30 degrees.  Negative Giancarlo.    Imaging/Studies  Imaging Results (Last 24 Hours)     ** No results found for the last 24 hours. **        MRI Lumbar Spine With & Without Contrast  Narrative: DATE OF EXAM: 7/29/2022 6:40 PM     PROCEDURE: MRI LUMBAR SPINE W WO CONTRAST-     INDICATIONS: L5-S1 possible discitis, elevated inflammatory markers,  severe back pain; M54.41-Lumbago with sciatica, right side;  R70.0-Elevated erythrocyte sedimentation rate; M46.46-Discitis,  unspecified, lumbar region     COMPARISON: Lumbar spine MRI dated 07/17/2022     TECHNIQUE: Routine magnetic resonance imaging of the lumbar spine " was  performed before and after administration of 20 ml of MultiHance  contrast.     FINDINGS:  There is normal AP and mediolateral alignment of the lumbar spine. The  vertebral body heights are maintained without evidence of acute  fracture. There is minimal linear suture hyperintense signal along the  inferior aspect of the L5 vertebral body which appears similar to the  prior examination. There is no abnormal T1 hypointense signal to suggest  osteomyelitis within the adjacent L5 or S1 levels. The conus terminates  at the L2 level which is normal. The nerve roots appear normal in  caliber. The paraspinal musculature appears symmetric. Visualized  portions of the retroperitoneum appear normal. The SI joints are  normally aligned.     Level by level analysis:  T12-L1: There is no disc bulge, spinal canal stenosis, or neuroforaminal  narrowing. There is no facet arthropathy or ligament flavum thickening.     L1-L2: There is no disc bulge, spinal canal stenosis, or neuroforaminal  narrowing. There is no facet arthropathy or ligament flavum thickening.     L2-L3: There is no disc bulge, spinal canal stenosis, or neuroforaminal  narrowing. There is no facet arthropathy or ligament flavum thickening.     L4-5: There is no disc bulge, spinal canal stenosis, or neuroforaminal  narrowing. There is no facet arthropathy or ligament flavum thickening.  There is a trace left facet joint effusion, similar to the prior exam.     L5-S1: There is disc desiccation. There is a small central disc  extrusion with slight inferior migration measuring 4 mm in size. There  is no spinal canal stenosis or neuroforaminal narrowing. There is no  contact of the traversing nerve roots. There is no abnormal enhancement  involving the disc or adjacent vertebral bodies. This makes  osteomyelitis unlikely particularly given the lack of change from the  prior MRI from 07/17/2022. There is no evidence of epidural collection.     Impression: 1.  Degenerative disc disease at L5-S1 with small central disc extrusion  with slight inferior migration which appears overall stable from the  prior exam from 07/17/2022. There is minimal STIR hyperintense signal  along the posterior aspect of the L5 endplate which is most likely  degenerative and less likely related to early discitis/osteomyelitis.  There is no abnormal enhancement or epidural collection.  2. No evidence of spinal canal stenosis or neuroforaminal narrowing.     This report was finalized on 7/29/2022 8:03 PM by Lamine Benavidez MD.       Reviewed radiographs done in July 2022 show no acute bony abnormality.  Patellofemoral disease is suggested.    Assessment    Assessment:  1. Acute pain of right knee    2. Numbness and tingling of right lower extremity        Plan:  1. Continue over-the-counter medication as needed for discomfort  2. Acute pain right knee--likely referred and related to her neurologic issue.  3. Numbness and tingling right lower extremity--patient has been evaluated by neurosurgery which is positive experience overall.  Given how vague the complaints are and nonfocal, I suggested a neurologic consult.  There may be an upper motor neuron issue and this could represent something like MS or less serious would be something like piriformis syndrome.  Nerve studies could be beneficial.  I will defer to neurology in this regard.  Referral made today.        Som Robertson MD  08/16/22  17:13 EDT      Dictated Utilizing Dragon Dictation.

## 2022-08-22 ENCOUNTER — OFFICE VISIT (OUTPATIENT)
Dept: FAMILY MEDICINE CLINIC | Facility: CLINIC | Age: 27
End: 2022-08-22

## 2022-08-22 VITALS
TEMPERATURE: 97.8 F | OXYGEN SATURATION: 98 % | DIASTOLIC BLOOD PRESSURE: 76 MMHG | WEIGHT: 282 LBS | SYSTOLIC BLOOD PRESSURE: 134 MMHG | HEART RATE: 80 BPM | RESPIRATION RATE: 16 BRPM | BODY MASS INDEX: 45.54 KG/M2

## 2022-08-22 DIAGNOSIS — M54.41 MIDLINE LOW BACK PAIN WITH RIGHT-SIDED SCIATICA, UNSPECIFIED CHRONICITY: Primary | ICD-10-CM

## 2022-08-22 DIAGNOSIS — M25.561 ACUTE PAIN OF RIGHT KNEE: ICD-10-CM

## 2022-08-22 PROCEDURE — 99214 OFFICE O/P EST MOD 30 MIN: CPT | Performed by: INTERNAL MEDICINE

## 2022-08-22 RX ORDER — GABAPENTIN 300 MG/1
300 CAPSULE ORAL 3 TIMES DAILY
Qty: 42 CAPSULE | Refills: 0 | Status: SHIPPED | OUTPATIENT
Start: 2022-08-22 | End: 2022-09-19

## 2022-08-22 RX ORDER — PREDNISONE 20 MG/1
40 TABLET ORAL DAILY
Qty: 10 TABLET | Refills: 0 | Status: SHIPPED | OUTPATIENT
Start: 2022-08-22 | End: 2022-08-27

## 2022-08-23 NOTE — PROGRESS NOTES
Chief Complaint   Patient presents with   • Back Pain   • Knee Pain     Right knee       HPI:  Shonetta Elaine Davis is a 27 y.o. female who presents today for follow-up low back and knee pain.  She is requesting a refill on pain medication.  She has neurology appointment scheduled for October.    ROS:  Constitutional: no fevers, night sweats or unexplained weight loss  Eyes: no vision changes  ENT: no runny nose, ear pain, sore throat  Cardio: no chest pain, palpitations  Pulm: no shortness of breath, wheezing, or cough  GI: no abdominal pain or changes in bowel movements  : no difficulty urinating  MSK: no difficulty ambulating, no joint pain  Neuro: no weakness, dizziness or headache  Psych: no trouble sleeping  Endo: no change in appetite      History reviewed. No pertinent past medical history.   Family History   Problem Relation Age of Onset   • Arthritis Mother    • Hypertension Mother    • Diabetes Mother    • Heart disease Mother    • Cancer Father    • Thyroid disease Father       Social History     Socioeconomic History   • Marital status: Single   Tobacco Use   • Smoking status: Passive Smoke Exposure - Never Smoker   • Smokeless tobacco: Never Used   Substance and Sexual Activity   • Alcohol use: Yes     Comment: 3 times a week   • Drug use: Never   • Sexual activity: Yes     Partners: Male     Birth control/protection: Condom      No Known Allergies     There is no immunization history on file for this patient.     PE:  Vitals:    08/22/22 1545   BP: 134/76   Pulse: 80   Resp: 16   Temp: 97.8 °F (36.6 °C)   SpO2: 98%      Body mass index is 45.54 kg/m².    Gen Appearance: NAD  HEENT: Normocephalic, PERRLA, no thyromegaly, trache midline  Heart: RRR, normal S1 and S2, no murmur  Lungs: CTA b/l, no wheezing, no crackles  Abdomen: Soft, non-tender, non-distended, no guarding and BSx4  MSK: Moves all extremities well, normal gait, no peripheral edema  Pulses: Palpable and equal b/l  Lymph nodes: No  palpable lymphadenopathy   Neuro: No focal deficits      Current Outpatient Medications   Medication Sig Dispense Refill   • gabapentin (NEURONTIN) 300 MG capsule Take 1 capsule by mouth 3 (Three) Times a Day for 14 days. 42 capsule 0   • predniSONE (DELTASONE) 20 MG tablet Take 2 tablets by mouth Daily for 5 days. 10 tablet 0     No current facility-administered medications for this visit.      Has already been evaluated by neurosurgery and orthopedics.  She has neurology appointment in a few months.  Recommend repeat course of steroids and gabapentin for neuropathic pain.    Diagnoses and all orders for this visit:    1. Midline low back pain with right-sided sciatica, unspecified chronicity (Primary)    2. Acute pain of right knee  -     gabapentin (NEURONTIN) 300 MG capsule; Take 1 capsule by mouth 3 (Three) Times a Day for 14 days.  Dispense: 42 capsule; Refill: 0  -     predniSONE (DELTASONE) 20 MG tablet; Take 2 tablets by mouth Daily for 5 days.  Dispense: 10 tablet; Refill: 0         Return in about 4 weeks (around 9/19/2022).     Dictated Utilizing Dragon Dictation    Please note that portions of this note were completed with a voice recognition program.    Part of this note may be an electronic transcription/translation of spoken language to printed text using the Dragon Dictation System.

## 2022-09-19 ENCOUNTER — OFFICE VISIT (OUTPATIENT)
Dept: FAMILY MEDICINE CLINIC | Facility: CLINIC | Age: 27
End: 2022-09-19

## 2022-09-19 VITALS
BODY MASS INDEX: 45.32 KG/M2 | DIASTOLIC BLOOD PRESSURE: 94 MMHG | WEIGHT: 282 LBS | HEART RATE: 88 BPM | SYSTOLIC BLOOD PRESSURE: 122 MMHG | HEIGHT: 66 IN | OXYGEN SATURATION: 98 %

## 2022-09-19 DIAGNOSIS — M54.41 MIDLINE LOW BACK PAIN WITH RIGHT-SIDED SCIATICA, UNSPECIFIED CHRONICITY: Primary | ICD-10-CM

## 2022-09-19 DIAGNOSIS — R06.83 SNORING: ICD-10-CM

## 2022-09-19 DIAGNOSIS — E66.01 CLASS 3 SEVERE OBESITY DUE TO EXCESS CALORIES WITHOUT SERIOUS COMORBIDITY WITH BODY MASS INDEX (BMI) OF 45.0 TO 49.9 IN ADULT: ICD-10-CM

## 2022-09-19 DIAGNOSIS — I10 PRIMARY HYPERTENSION: ICD-10-CM

## 2022-09-19 DIAGNOSIS — R06.81 WITNESSED EPISODE OF APNEA: ICD-10-CM

## 2022-09-19 DIAGNOSIS — G47.00 INSOMNIA, UNSPECIFIED TYPE: ICD-10-CM

## 2022-09-19 PROCEDURE — 99214 OFFICE O/P EST MOD 30 MIN: CPT | Performed by: INTERNAL MEDICINE

## 2022-09-19 RX ORDER — GABAPENTIN 600 MG/1
600 TABLET ORAL 3 TIMES DAILY
Qty: 90 TABLET | Refills: 2 | Status: SHIPPED | OUTPATIENT
Start: 2022-09-19

## 2022-09-19 RX ORDER — TRAZODONE HYDROCHLORIDE 50 MG/1
50 TABLET ORAL NIGHTLY
Qty: 90 TABLET | Refills: 1 | Status: SHIPPED | OUTPATIENT
Start: 2022-09-19

## 2022-09-22 NOTE — PROGRESS NOTES
"Chief Complaint   Patient presents with   • Knee Pain     Pain still comes and goes. Gabapentin helps for an hour each time taken.    • Back Pain     Discuss lab results    • Shortness of Breath     Friday night pt woke up coughing \"trying to catch her breath\"        HPI:  Shonetta Elaine Davis is a 27 y.o. female who presents today for continued knee and back pain.  She has upcoming appointment with neurology.    ROS:  Constitutional: no fevers, night sweats or unexplained weight loss  Eyes: no vision changes  ENT: no runny nose, ear pain, sore throat  Cardio: no chest pain, palpitations  Pulm: no shortness of breath, wheezing, or cough  GI: no abdominal pain or changes in bowel movements  : no difficulty urinating  MSK: no difficulty ambulating, no joint pain  Neuro: no weakness, dizziness or headache  Psych: no trouble sleeping  Endo: no change in appetite      History reviewed. No pertinent past medical history.   Family History   Problem Relation Age of Onset   • Arthritis Mother    • Hypertension Mother    • Diabetes Mother    • Heart disease Mother    • Cancer Father    • Thyroid disease Father       Social History     Socioeconomic History   • Marital status: Single   Tobacco Use   • Smoking status: Passive Smoke Exposure - Never Smoker   • Smokeless tobacco: Never Used   Substance and Sexual Activity   • Alcohol use: Yes     Comment: 3 times a week   • Drug use: Never   • Sexual activity: Yes     Partners: Male     Birth control/protection: Condom      No Known Allergies     There is no immunization history on file for this patient.     PE:  Vitals:    09/19/22 1536   BP: 122/94   Pulse: 88   SpO2: 98%      Body mass index is 45.54 kg/m².    Gen Appearance: NAD  HEENT: Normocephalic, PERRLA, no thyromegaly, trache midline  Heart: RRR, normal S1 and S2, no murmur  Lungs: CTA b/l, no wheezing, no crackles  Abdomen: Soft, non-tender, non-distended, no guarding and BSx4  MSK: Moves all extremities well, " normal gait, no peripheral edema  Pulses: Palpable and equal b/l  Lymph nodes: No palpable lymphadenopathy   Neuro: No focal deficits      Current Outpatient Medications   Medication Sig Dispense Refill   • gabapentin (NEURONTIN) 600 MG tablet Take 1 tablet by mouth 3 (Three) Times a Day. 90 tablet 2   • traZODone (DESYREL) 50 MG tablet Take 1 tablet by mouth Every Night. 90 tablet 1     No current facility-administered medications for this visit.      Refer to pain management to establish care.  Would recommend neurologic evaluation initially.  Continue gabapentin.  Trazodone as needed for insomnia.  Recommend checking sleep study due to obesity, excessive daytime sleepiness, morning headache.    Diagnoses and all orders for this visit:    1. Midline low back pain with right-sided sciatica, unspecified chronicity (Primary)  -     Ambulatory Referral to Pain Management  -     gabapentin (NEURONTIN) 600 MG tablet; Take 1 tablet by mouth 3 (Three) Times a Day.  Dispense: 90 tablet; Refill: 2    2. Insomnia, unspecified type  -     traZODone (DESYREL) 50 MG tablet; Take 1 tablet by mouth Every Night.  Dispense: 90 tablet; Refill: 1    3. Primary hypertension    4. Class 3 severe obesity due to excess calories without serious comorbidity with body mass index (BMI) of 45.0 to 49.9 in adult (HCC)  -     Home Sleep Study; Future    5. Snoring  -     Home Sleep Study; Future    6. Witnessed episode of apnea  -     Home Sleep Study; Future         No follow-ups on file.     Dictated Utilizing Dragon Dictation    Please note that portions of this note were completed with a voice recognition program.    Part of this note may be an electronic transcription/translation of spoken language to printed text using the Dragon Dictation System.

## 2022-09-28 ENCOUNTER — HOSPITAL ENCOUNTER (EMERGENCY)
Facility: HOSPITAL | Age: 27
Discharge: HOME | End: 2022-09-28
Attending: EMERGENCY MEDICINE | Admitting: EMERGENCY MEDICINE

## 2022-09-28 ENCOUNTER — APPOINTMENT (OUTPATIENT)
Dept: GENERAL RADIOLOGY | Facility: HOSPITAL | Age: 27
End: 2022-09-28

## 2022-09-28 VITALS
WEIGHT: 282 LBS | SYSTOLIC BLOOD PRESSURE: 108 MMHG | DIASTOLIC BLOOD PRESSURE: 65 MMHG | HEART RATE: 69 BPM | HEIGHT: 66 IN | OXYGEN SATURATION: 99 % | TEMPERATURE: 97.9 F | RESPIRATION RATE: 16 BRPM | BODY MASS INDEX: 45.32 KG/M2

## 2022-09-28 DIAGNOSIS — R07.89 OTHER CHEST PAIN: Primary | ICD-10-CM

## 2022-09-28 DIAGNOSIS — R13.10 PAIN OR BURNING WHEN SWALLOWING: ICD-10-CM

## 2022-09-28 LAB
ALBUMIN SERPL-MCNC: 4 G/DL (ref 3.5–5.2)
ALBUMIN/GLOB SERPL: 1.3 G/DL
ALP SERPL-CCNC: 82 U/L (ref 39–117)
ALT SERPL W P-5'-P-CCNC: 9 U/L (ref 1–33)
ANION GAP SERPL CALCULATED.3IONS-SCNC: 8 MMOL/L (ref 5–15)
AST SERPL-CCNC: 11 U/L (ref 1–32)
BASOPHILS # BLD AUTO: 0.05 10*3/MM3 (ref 0–0.2)
BASOPHILS NFR BLD AUTO: 0.5 % (ref 0–1.5)
BILIRUB SERPL-MCNC: <0.2 MG/DL (ref 0–1.2)
BUN SERPL-MCNC: 16 MG/DL (ref 6–20)
BUN/CREAT SERPL: 23.9 (ref 7–25)
CALCIUM SPEC-SCNC: 8.6 MG/DL (ref 8.6–10.5)
CHLORIDE SERPL-SCNC: 102 MMOL/L (ref 98–107)
CO2 SERPL-SCNC: 29 MMOL/L (ref 22–29)
CREAT SERPL-MCNC: 0.67 MG/DL (ref 0.57–1)
DEPRECATED RDW RBC AUTO: 40.5 FL (ref 37–54)
EGFRCR SERPLBLD CKD-EPI 2021: 122.3 ML/MIN/1.73
EOSINOPHIL # BLD AUTO: 0.17 10*3/MM3 (ref 0–0.4)
EOSINOPHIL NFR BLD AUTO: 1.8 % (ref 0.3–6.2)
ERYTHROCYTE [DISTWIDTH] IN BLOOD BY AUTOMATED COUNT: 14.2 % (ref 12.3–15.4)
GLOBULIN UR ELPH-MCNC: 3.2 GM/DL
GLUCOSE SERPL-MCNC: 78 MG/DL (ref 65–99)
HCT VFR BLD AUTO: 38.6 % (ref 34–46.6)
HGB BLD-MCNC: 12.3 G/DL (ref 12–15.9)
HOLD SPECIMEN: NORMAL
IMM GRANULOCYTES # BLD AUTO: 0.05 10*3/MM3 (ref 0–0.05)
IMM GRANULOCYTES NFR BLD AUTO: 0.5 % (ref 0–0.5)
LIPASE SERPL-CCNC: 24 U/L (ref 13–60)
LYMPHOCYTES # BLD AUTO: 3.89 10*3/MM3 (ref 0.7–3.1)
LYMPHOCYTES NFR BLD AUTO: 41.4 % (ref 19.6–45.3)
MCH RBC QN AUTO: 25.2 PG (ref 26.6–33)
MCHC RBC AUTO-ENTMCNC: 31.9 G/DL (ref 31.5–35.7)
MCV RBC AUTO: 79.1 FL (ref 79–97)
MONOCYTES # BLD AUTO: 0.64 10*3/MM3 (ref 0.1–0.9)
MONOCYTES NFR BLD AUTO: 6.8 % (ref 5–12)
NEUTROPHILS NFR BLD AUTO: 4.59 10*3/MM3 (ref 1.7–7)
NEUTROPHILS NFR BLD AUTO: 49 % (ref 42.7–76)
NRBC BLD AUTO-RTO: 0 /100 WBC (ref 0–0.2)
NT-PROBNP SERPL-MCNC: 43.2 PG/ML (ref 0–450)
PLATELET # BLD AUTO: 272 10*3/MM3 (ref 140–450)
PMV BLD AUTO: 9.5 FL (ref 6–12)
POTASSIUM SERPL-SCNC: 3.7 MMOL/L (ref 3.5–5.2)
PROT SERPL-MCNC: 7.2 G/DL (ref 6–8.5)
RBC # BLD AUTO: 4.88 10*6/MM3 (ref 3.77–5.28)
SODIUM SERPL-SCNC: 139 MMOL/L (ref 136–145)
TROPONIN T SERPL-MCNC: <0.01 NG/ML (ref 0–0.03)
TROPONIN T SERPL-MCNC: <0.01 NG/ML (ref 0–0.03)
WBC NRBC COR # BLD: 9.39 10*3/MM3 (ref 3.4–10.8)
WHOLE BLOOD HOLD COAG: NORMAL
WHOLE BLOOD HOLD SPECIMEN: NORMAL

## 2022-09-28 PROCEDURE — 80053 COMPREHEN METABOLIC PANEL: CPT | Performed by: EMERGENCY MEDICINE

## 2022-09-28 PROCEDURE — 83690 ASSAY OF LIPASE: CPT | Performed by: EMERGENCY MEDICINE

## 2022-09-28 PROCEDURE — 93005 ELECTROCARDIOGRAM TRACING: CPT | Performed by: EMERGENCY MEDICINE

## 2022-09-28 PROCEDURE — 84484 ASSAY OF TROPONIN QUANT: CPT | Performed by: EMERGENCY MEDICINE

## 2022-09-28 PROCEDURE — 71045 X-RAY EXAM CHEST 1 VIEW: CPT

## 2022-09-28 PROCEDURE — 36415 COLL VENOUS BLD VENIPUNCTURE: CPT

## 2022-09-28 PROCEDURE — 83880 ASSAY OF NATRIURETIC PEPTIDE: CPT | Performed by: EMERGENCY MEDICINE

## 2022-09-28 PROCEDURE — 99284 EMERGENCY DEPT VISIT MOD MDM: CPT

## 2022-09-28 PROCEDURE — 85025 COMPLETE CBC W/AUTO DIFF WBC: CPT | Performed by: EMERGENCY MEDICINE

## 2022-09-28 RX ORDER — PANTOPRAZOLE SODIUM 40 MG/1
40 TABLET, DELAYED RELEASE ORAL
Qty: 30 TABLET | Refills: 0 | Status: SHIPPED | OUTPATIENT
Start: 2022-09-28

## 2022-09-28 RX ORDER — ASPIRIN 81 MG/1
324 TABLET, CHEWABLE ORAL ONCE
Status: COMPLETED | OUTPATIENT
Start: 2022-09-28 | End: 2022-09-28

## 2022-09-28 RX ORDER — SODIUM CHLORIDE 0.9 % (FLUSH) 0.9 %
10 SYRINGE (ML) INJECTION AS NEEDED
Status: DISCONTINUED | OUTPATIENT
Start: 2022-09-28 | End: 2022-09-28 | Stop reason: HOSPADM

## 2022-09-28 RX ORDER — METOCLOPRAMIDE 10 MG/1
10 TABLET ORAL
Qty: 60 TABLET | Refills: 2 | Status: SHIPPED | OUTPATIENT
Start: 2022-09-28

## 2022-09-28 RX ADMIN — LIDOCAINE HYDROCHLORIDE: 20 SOLUTION ORAL; TOPICAL at 15:25

## 2022-09-28 RX ADMIN — ASPIRIN 81 MG 324 MG: 81 TABLET ORAL at 14:22

## 2022-10-04 LAB
QT INTERVAL: 394 MS
QTC INTERVAL: 451 MS

## 2022-10-10 ENCOUNTER — TELEPHONE (OUTPATIENT)
Dept: CARDIOLOGY | Facility: HOSPITAL | Age: 27
End: 2022-10-10

## 2022-10-10 NOTE — TELEPHONE ENCOUNTER
Pt was referred to [facility] by the [facility] ER. Several attempts have been made to contact the pt with no success. Letter was mailed to pt to contact the office to schedule

## 2023-01-09 ENCOUNTER — HOSPITAL ENCOUNTER (EMERGENCY)
Facility: HOSPITAL | Age: 28
Discharge: HOME | End: 2023-01-09
Attending: EMERGENCY MEDICINE | Admitting: EMERGENCY MEDICINE
Payer: MEDICAID

## 2023-01-09 VITALS
HEART RATE: 92 BPM | DIASTOLIC BLOOD PRESSURE: 99 MMHG | BODY MASS INDEX: 46.61 KG/M2 | HEIGHT: 66 IN | TEMPERATURE: 99.7 F | OXYGEN SATURATION: 99 % | RESPIRATION RATE: 20 BRPM | WEIGHT: 290 LBS | SYSTOLIC BLOOD PRESSURE: 140 MMHG

## 2023-01-09 DIAGNOSIS — R05.1 ACUTE COUGH: ICD-10-CM

## 2023-01-09 DIAGNOSIS — R52 BODY ACHES: ICD-10-CM

## 2023-01-09 DIAGNOSIS — R51.9 ACUTE NONINTRACTABLE HEADACHE, UNSPECIFIED HEADACHE TYPE: Primary | ICD-10-CM

## 2023-01-09 LAB
FLUAV RNA RESP QL NAA+PROBE: NOT DETECTED
FLUBV RNA RESP QL NAA+PROBE: NOT DETECTED
SARS-COV-2 RNA RESP QL NAA+PROBE: NOT DETECTED

## 2023-01-09 PROCEDURE — 63710000001 ONDANSETRON ODT 4 MG TABLET DISPERSIBLE

## 2023-01-09 PROCEDURE — 99283 EMERGENCY DEPT VISIT LOW MDM: CPT

## 2023-01-09 PROCEDURE — C9803 HOPD COVID-19 SPEC COLLECT: HCPCS

## 2023-01-09 PROCEDURE — 87636 SARSCOV2 & INF A&B AMP PRB: CPT

## 2023-01-09 RX ORDER — IBUPROFEN 400 MG/1
800 TABLET ORAL ONCE
Status: COMPLETED | OUTPATIENT
Start: 2023-01-09 | End: 2023-01-09

## 2023-01-09 RX ORDER — ACETAMINOPHEN 500 MG
1000 TABLET ORAL ONCE
Status: COMPLETED | OUTPATIENT
Start: 2023-01-09 | End: 2023-01-09

## 2023-01-09 RX ORDER — ONDANSETRON 4 MG/1
4 TABLET, ORALLY DISINTEGRATING ORAL EVERY 6 HOURS PRN
Qty: 20 TABLET | Refills: 0 | Status: SHIPPED | OUTPATIENT
Start: 2023-01-09 | End: 2023-01-14

## 2023-01-09 RX ORDER — GUAIFENESIN 200 MG/10ML
200 LIQUID ORAL EVERY 4 HOURS PRN
Qty: 180 ML | Refills: 0 | Status: SHIPPED | OUTPATIENT
Start: 2023-01-09

## 2023-01-09 RX ORDER — ONDANSETRON 4 MG/1
4 TABLET, ORALLY DISINTEGRATING ORAL ONCE
Status: COMPLETED | OUTPATIENT
Start: 2023-01-09 | End: 2023-01-09

## 2023-01-09 RX ADMIN — ACETAMINOPHEN 1000 MG: 500 TABLET, FILM COATED ORAL at 20:24

## 2023-01-09 RX ADMIN — ONDANSETRON 4 MG: 4 TABLET, ORALLY DISINTEGRATING ORAL at 20:23

## 2023-01-09 RX ADMIN — IBUPROFEN 800 MG: 400 TABLET ORAL at 20:23

## 2023-01-09 NOTE — Clinical Note
[facility] EMERGENCY DEPARTMENT  [address] [address]  Phone: [phone]    Shonetta Davis was seen and treated in our emergency department on 1/9/2023.  She may return to work on 01/12/2023.         Thank you for choosing [facility].    Willie Pinedo, DO

## 2023-01-10 NOTE — ED PROVIDER NOTES
Subjective   History of Present Illness  Shonetta Elaine Davis is a 27-year-old female with no significant past medical history who presents to the ER for evaluation of multiple complaints.  Patient states starting yesterday she began to have body aches, headache, cough, chills.  Patient describes the headache as a frontal headache.  She also endorses some mild nausea for which she has not taken any medications prior to arrival.  Patient states today she slept the majority of the day as she felt fatigued.  She denies any recent sick contacts, shortness of breath, fever, vomiting, chest pain, diarrhea.            Review of Systems   Constitutional: Positive for chills. Negative for fatigue and fever.   HENT: Negative for congestion and sore throat.    Respiratory: Positive for cough. Negative for shortness of breath.    Cardiovascular: Negative for chest pain.   Gastrointestinal: Negative for abdominal pain.   Genitourinary: Negative for dysuria.   Musculoskeletal: Positive for myalgias. Negative for back pain.   Skin: Negative for rash.   Neurological: Positive for headaches.   Psychiatric/Behavioral: Negative for agitation and confusion.   All other systems reviewed and are negative.      History reviewed. No pertinent past medical history.    No Known Allergies    Past Surgical History:   Procedure Laterality Date   • TONSILLECTOMY AND ADENOIDECTOMY         Family History   Problem Relation Age of Onset   • Arthritis Mother    • Hypertension Mother    • Diabetes Mother    • Heart disease Mother    • Cancer Father    • Thyroid disease Father        Social History     Socioeconomic History   • Marital status: Single   Tobacco Use   • Smoking status: Passive Smoke Exposure - Never Smoker   • Smokeless tobacco: Never   Substance and Sexual Activity   • Alcohol use: Yes     Comment: 3 times a week   • Drug use: Never   • Sexual activity: Yes     Partners: Male     Birth control/protection: Condom           Objective    Physical Exam  Vitals and nursing note reviewed.   Constitutional:       Appearance: Normal appearance.   HENT:      Head: Normocephalic and atraumatic.      Mouth/Throat:      Mouth: Mucous membranes are moist.      Pharynx: Oropharynx is clear.   Cardiovascular:      Rate and Rhythm: Normal rate and regular rhythm.      Pulses: Normal pulses.      Heart sounds: Normal heart sounds.   Pulmonary:      Effort: Pulmonary effort is normal.      Breath sounds: Normal breath sounds.   Abdominal:      Palpations: Abdomen is soft.      Tenderness: There is no abdominal tenderness.   Musculoskeletal:      Cervical back: Normal range of motion and neck supple.   Skin:     General: Skin is warm and dry.      Capillary Refill: Capillary refill takes less than 2 seconds.   Neurological:      General: No focal deficit present.      Mental Status: She is alert and oriented to person, place, and time.   Psychiatric:         Mood and Affect: Mood normal.         Behavior: Behavior normal.         Procedures           ED Course  ED Course as of 01/10/23 0458   Mon Jan 09, 2023 1958 COVID PRE-OP / PRE-PROCEDURE SCREENING ORDER (NO ISOLATION) - Swab, Nasopharynx  Negative   [MA]      ED Course User Index  [MA] Debora Walter, ROEL                                           Medical Decision Making    Shonetta Elaine Davis  is a 27 yrs old female  presents today for evaluation of multiple complaints.    Based on her history and physical exam, my differential diagnosis included several life threatening conditions including URI, COVID-19, flu, pneumonia. Ruling out the most morbid conditions drove my clinical assessment.     In order to fully explore differential these tests and treatments were ordered.   Orders Placed This Encounter      COVID PRE-OP / PRE-PROCEDURE SCREENING ORDER (NO ISOLATION) - Swab, Nasopharynx      COVID-19 and FLU A/B PCR - Swab, Nasopharynx     Medications  ondansetron ODT (ZOFRAN-ODT) disintegrating tablet 4  mg (4 mg Oral Given 1/9/23 2023)  acetaminophen (TYLENOL) tablet 1,000 mg (1,000 mg Oral Given 1/9/23 2024)  ibuprofen (ADVIL,MOTRIN) tablet 800 mg (800 mg Oral Given 1/9/23 2023)     Old records for this patient including outpatient visits were reviewed and found to be not pertinent.     Lab results were reviewed independently and can be interpreted as negative for COVID-19 or the flu.  Chest x-ray was considered but given patient presentation and clear lung sounds bilaterally, further evaluation with chest x-ray was not warranted at this time.    Patient reevaluated after treatments provided and condition was improved upon my reevaluation.    I had an interactive discussion with the patient regarding her findings.  Advised patient to continue with Tylenol and ibuprofen as needed for pain relief at home and rest as well as increase her oral fluid intake.    Based on work up today patient did not require consult with any service.    Ultimately, this patient was discharged.  DISCHARGE    Patient discharged in stable condition.    Reviewed implications of results, diagnosis, meds, responsibility to follow up, warning signs and symptoms of possible worsening, potential complications and reasons to return to ER.    Patient/Family voiced understanding of above instructions.    Discussed plan for discharge, as there is no emergent indication for admission.  Pt/family is agreeable and understands need for follow up and possible repeat testing.  Pt/family is aware that discharge does not mean that nothing is wrong but that it indicates no emergency is currently present that requires admission and they must continue care with follow-up as given below or with a physician of their choice.     FOLLOW-UP  Jose Jean, [id number] YESSENIASPRATIK [facility] [phone]       Medication List      New Prescriptions    guaifenesin 100 MG/5ML liquid  Commonly known as: ROBITUSSIN  Take 10 mL by mouth Every 4 (Four) Hours  As Needed for Cough.     ondansetron ODT 4 MG disintegrating tablet  Commonly known as: ZOFRAN-ODT  Place 1 tablet on the tongue Every 6 (Six) Hours As Needed for Nausea or   Vomiting for up to 5 days.           Where to Get Your Medications      These medications were sent to [facility] [address] [place] [address] [phone] [facility] [phone] [place] [id number]    Phone: [phone]   guaifenesin 100 MG/5ML liquid  ondansetron ODT 4 MG disintegrating tablet         Acute cough: acute illness or injury  Acute nonintractable headache, unspecified headache type: acute illness or injury  Body aches: acute illness or injury  Amount and/or Complexity of Data Reviewed  Labs:  Decision-making details documented in ED Course.      Risk  OTC drugs.  Prescription drug management.          Final diagnoses:   Acute nonintractable headache, unspecified headache type   Acute cough   Body aches       ED Disposition  ED Disposition     ED Disposition   Discharge    Condition   Stable    Comment   --             Jose Jean DO  [address] [place] [address]  [phone]               Medication List      New Prescriptions    guaifenesin 100 MG/5ML liquid  Commonly known as: ROBITUSSIN  Take 10 mL by mouth Every 4 (Four) Hours As Needed for Cough.     ondansetron ODT 4 MG disintegrating tablet  Commonly known as: ZOFRAN-ODT  Place 1 tablet on the tongue Every 6 (Six) Hours As Needed for Nausea or Vomiting for up to 5 days.           Where to Get Your Medications      These medications were sent to [facility] [address] [place] [address] [phone] [place] [phone] [place] [id number]    Phone: [phone]   · guaifenesin 100 MG/5ML liquid  · ondansetron ODT 4 MG disintegrating tablet          Debora Walter PA-C  01/10/23 0458

## 2023-01-10 NOTE — DISCHARGE INSTRUCTIONS
Please rest and take tylenol or ibuprofen as needed for pain relief at home. Use zofran as needed for nausea. Follow-up with your primary care doctor in 3-5 days for re-evaluation. Return to the ER if you have shortness of breath, nausea, vomiting despite medication, chest pain.

## 2024-02-20 ENCOUNTER — HOSPITAL ENCOUNTER (EMERGENCY)
Facility: HOSPITAL | Age: 29
Discharge: HOME | End: 2024-02-20
Attending: EMERGENCY MEDICINE | Admitting: EMERGENCY MEDICINE
Payer: MEDICAID

## 2024-02-20 VITALS
RESPIRATION RATE: 18 BRPM | TEMPERATURE: 97 F | SYSTOLIC BLOOD PRESSURE: 170 MMHG | HEART RATE: 86 BPM | HEIGHT: 66 IN | BODY MASS INDEX: 47.09 KG/M2 | WEIGHT: 293 LBS | OXYGEN SATURATION: 99 % | DIASTOLIC BLOOD PRESSURE: 115 MMHG

## 2024-02-20 DIAGNOSIS — M51.36 DDD (DEGENERATIVE DISC DISEASE), LUMBAR: ICD-10-CM

## 2024-02-20 DIAGNOSIS — M54.41 ACUTE MIDLINE LOW BACK PAIN WITH BILATERAL SCIATICA: Primary | ICD-10-CM

## 2024-02-20 DIAGNOSIS — M54.42 ACUTE MIDLINE LOW BACK PAIN WITH BILATERAL SCIATICA: Primary | ICD-10-CM

## 2024-02-20 PROCEDURE — 25010000002 KETOROLAC TROMETHAMINE PER 15 MG: Performed by: EMERGENCY MEDICINE

## 2024-02-20 PROCEDURE — 96372 THER/PROPH/DIAG INJ SC/IM: CPT

## 2024-02-20 PROCEDURE — 99283 EMERGENCY DEPT VISIT LOW MDM: CPT

## 2024-02-20 RX ORDER — LIDOCAINE 50 MG/G
1 PATCH TOPICAL EVERY 24 HOURS
Qty: 7 PATCH | Refills: 0 | Status: SHIPPED | OUTPATIENT
Start: 2024-02-20 | End: 2024-02-27

## 2024-02-20 RX ORDER — CYCLOBENZAPRINE HCL 10 MG
10 TABLET ORAL ONCE
Status: COMPLETED | OUTPATIENT
Start: 2024-02-20 | End: 2024-02-20

## 2024-02-20 RX ORDER — LIDOCAINE 4 G/G
1 PATCH TOPICAL
Status: DISCONTINUED | OUTPATIENT
Start: 2024-02-20 | End: 2024-02-21 | Stop reason: HOSPADM

## 2024-02-20 RX ORDER — ACETAMINOPHEN 500 MG
1000 TABLET ORAL ONCE
Status: COMPLETED | OUTPATIENT
Start: 2024-02-20 | End: 2024-02-20

## 2024-02-20 RX ORDER — KETOROLAC TROMETHAMINE 30 MG/ML
30 INJECTION, SOLUTION INTRAMUSCULAR; INTRAVENOUS ONCE
Status: COMPLETED | OUTPATIENT
Start: 2024-02-20 | End: 2024-02-20

## 2024-02-20 RX ORDER — CYCLOBENZAPRINE HCL 10 MG
10 TABLET ORAL 3 TIMES DAILY
Qty: 15 TABLET | Refills: 0 | Status: SHIPPED | OUTPATIENT
Start: 2024-02-20 | End: 2024-02-25

## 2024-02-20 RX ADMIN — CYCLOBENZAPRINE HYDROCHLORIDE 10 MG: 10 TABLET, FILM COATED ORAL at 22:42

## 2024-02-20 RX ADMIN — LIDOCAINE 1 PATCH: 4 PATCH TOPICAL at 22:47

## 2024-02-20 RX ADMIN — KETOROLAC TROMETHAMINE 30 MG: 30 INJECTION, SOLUTION INTRAMUSCULAR; INTRAVENOUS at 22:01

## 2024-02-20 RX ADMIN — ACETAMINOPHEN 1000 MG: 500 TABLET ORAL at 21:59

## 2024-02-21 NOTE — ED PROVIDER NOTES
Subjective   History of Present Illness  Patient presents for evaluation of acute on chronic midline low back pain in the lumbar area.  Patient states that she has intermittent muscle spasms which can be severe.  She has pain and numbness that radiates down the leg on the right and occasionally on the left.  She states she knows she has problems with her back and admits that she has not been good about following up with recommended medical doctors for this problem.    States that her main goal tonight is pain control and getting into see a spine specialist as an outpatient.    Patient has not had any weakness, no IV drug use or known cancer.  She does have intermittent episodes of urinary incontinence especially with coughing.    History provided by:  Patient      Review of Systems    No past medical history on file.    No Known Allergies    Past Surgical History:   Procedure Laterality Date    TONSILLECTOMY AND ADENOIDECTOMY         Family History   Problem Relation Age of Onset    Arthritis Mother     Hypertension Mother     Diabetes Mother     Heart disease Mother     Cancer Father     Thyroid disease Father        Social History     Socioeconomic History    Marital status: Single   Tobacco Use    Smoking status: Passive Smoke Exposure - Never Smoker    Smokeless tobacco: Never   Substance and Sexual Activity    Alcohol use: Yes     Comment: 3 times a week    Drug use: Never    Sexual activity: Yes     Partners: Male     Birth control/protection: Condom           Objective   Physical Exam  Constitutional:       General: She is not in acute distress.     Appearance: She is obese.   HENT:      Head: Normocephalic and atraumatic.   Eyes:      Conjunctiva/sclera: Conjunctivae normal.      Pupils: Pupils are equal, round, and reactive to light.   Cardiovascular:      Rate and Rhythm: Normal rate and regular rhythm.      Pulses: Normal pulses.      Heart sounds: No murmur heard.     No gallop.   Pulmonary:      Effort:  Pulmonary effort is normal. No respiratory distress.   Abdominal:      General: Abdomen is flat. There is no distension.      Tenderness: There is no abdominal tenderness.   Musculoskeletal:         General: No swelling or deformity. Normal range of motion.      Comments: Tenderness to the midline lumbar spine.  No significant paraspinous muscular tenderness.  Strength 5 out of 5 in the bilateral lower extremities and sensation to light touch intact throughout.   Skin:     General: Skin is warm and dry.      Capillary Refill: Capillary refill takes less than 2 seconds.   Neurological:      General: No focal deficit present.      Mental Status: She is alert and oriented to person, place, and time.   Psychiatric:         Mood and Affect: Mood normal.         Behavior: Behavior normal.         Procedures           ED Course                                             Medical Decision Making  Differential diagnosis includes spinal fracture, muscular strain, cauda equina syndrome, radiculopathy, osteomyelitis or discitis.  Based on patient's episodes of urinary incontinence and findings on previous MRI I believe patient could benefit from repeat MRI at this time.  When discussing this with the patient she declines an MRI in the emergency department stating that her goal is pain control this evening so she can work and help establishing outpatient follow-up with a spine specialist.  Given that she has no neurologic abnormalities on my examination, that he does not have infectious symptoms at this time suggestive of osteomyelitis or discitis, and that she is aware of potential worsening of her clinical condition I leave this is a reasonable plan.  She was given Toradol, lidocaine patch, Tylenol in the ER, given outpatient neurosurgery referral and discharged from the ER in good condition    Amount and/or Complexity of Data Reviewed  External Data Reviewed: notes.     Details: 1/9/2023 reviewed ED visit where patient was  "treated for suspected viral syndrome.  Reviewed prior MRI from 2022 showing the following  IMPRESSION:  1. Degenerative disc disease at L5-S1 with small central disc extrusion  with slight inferior migration which appears overall stable from the  prior exam from 07/17/2022. There is minimal STIR hyperintense signal  along the posterior aspect of the L5 endplate which is most likely  degenerative and less likely related to early discitis/osteomyelitis.  There is no abnormal enhancement or epidural collection.  2. No evidence of spinal canal stenosis or neuroforaminal narrowing.      Risk  OTC drugs.  Prescription drug management.        Final diagnoses:   Acute midline low back pain with bilateral sciatica   DDD (degenerative disc disease), lumbar       ED Disposition  ED Disposition       ED Disposition   Discharge    Condition   Stable    Comment   --           No results found for this or any previous visit (from the past 24 hour(s)).  Note: In addition to lab results from this visit, the labs listed above may include labs taken at another facility or during a different encounter within the last 24 hours. Please correlate lab times with ED admission and discharge times for further clarification of the services performed during this visit.    No orders to display     Vitals:    02/20/24 2021   BP: (!) 170/115   BP Location: Left arm   Patient Position: Sitting   Pulse: 86   Resp: 18   Temp: 97 °F (36.1 °C)   TempSrc: Oral   SpO2: 99%   Weight: 135 kg (297 lb)   Height: 167.6 cm (66\")     Medications   ketorolac (TORADOL) injection 30 mg (has no administration in time range)   acetaminophen (TYLENOL) tablet 1,000 mg (has no administration in time range)   Lidocaine 4 % 1 patch (has no administration in time range)     ECG/EMG Results (last 24 hours)       ** No results found for the last 24 hours. **          No orders to display           No follow-up provider specified.       Medication List        New " Prescriptions      cyclobenzaprine 10 MG tablet  Commonly known as: FLEXERIL  Take 1 tablet by mouth 3 (Three) Times a Day for 5 days.     lidocaine 5 %  Commonly known as: LIDODERM  Place 1 patch on the skin as directed by provider Daily for 7 days. Remove & Discard patch within 12 hours or as directed by MD               Where to Get Your Medications        These medications were sent to [facility] [id number] - [place], [place] - [facility]  AT [facility] [address] - [phone]  - [phone] [address] , [facility] [address]      Phone: [phone]   cyclobenzaprine 10 MG tablet  lidocaine 5 %            Garfield De La Fuente MD  02/20/24 3640

## 2024-02-21 NOTE — DISCHARGE INSTRUCTIONS
I recommend you take Tylenol 650 mg every 6 hours and ibuprofen 400 mg every 6 hours as needed for pain unless you have a contraindication to these medications  Use prescribed lidocaine patch for additional pain relief.  Use prescribed muscle relaxer cyclobenzaprine as needed to help with muscle spasms.  You should receive a phone call in the next 1 to 2 days to establish a follow-up appointment in the neurosurgery clinic.  Return to the ER as needed for new or worsening symptoms

## 2024-07-03 ENCOUNTER — HOSPITAL ENCOUNTER (EMERGENCY)
Facility: HOSPITAL | Age: 29
Discharge: HOME | End: 2024-07-03
Attending: FAMILY MEDICINE
Payer: MEDICAID

## 2024-07-03 VITALS
HEIGHT: 66 IN | SYSTOLIC BLOOD PRESSURE: 126 MMHG | TEMPERATURE: 98.4 F | RESPIRATION RATE: 18 BRPM | WEIGHT: 293 LBS | BODY MASS INDEX: 47.09 KG/M2 | HEART RATE: 105 BPM | OXYGEN SATURATION: 97 % | DIASTOLIC BLOOD PRESSURE: 74 MMHG

## 2024-07-03 DIAGNOSIS — M54.42 ACUTE LEFT-SIDED LOW BACK PAIN WITH LEFT-SIDED SCIATICA: Primary | ICD-10-CM

## 2024-07-03 PROCEDURE — 25010000002 DEXAMETHASONE PER 1 MG: Performed by: FAMILY MEDICINE

## 2024-07-03 PROCEDURE — 99282 EMERGENCY DEPT VISIT SF MDM: CPT

## 2024-07-03 PROCEDURE — 96372 THER/PROPH/DIAG INJ SC/IM: CPT

## 2024-07-03 PROCEDURE — 25010000002 KETOROLAC TROMETHAMINE PER 15 MG: Performed by: FAMILY MEDICINE

## 2024-07-03 RX ORDER — DEXAMETHASONE SODIUM PHOSPHATE 10 MG/ML
10 INJECTION INTRAMUSCULAR; INTRAVENOUS ONCE
Status: COMPLETED | OUTPATIENT
Start: 2024-07-03 | End: 2024-07-03

## 2024-07-03 RX ORDER — PREDNISONE 20 MG/1
60 TABLET ORAL DAILY
Qty: 15 TABLET | Refills: 0 | Status: SHIPPED | OUTPATIENT
Start: 2024-07-03 | End: 2024-07-08

## 2024-07-03 RX ORDER — CYCLOBENZAPRINE HCL 10 MG
10 TABLET ORAL 3 TIMES DAILY PRN
Qty: 12 TABLET | Refills: 0 | Status: SHIPPED | OUTPATIENT
Start: 2024-07-03

## 2024-07-03 RX ORDER — KETOROLAC TROMETHAMINE 30 MG/ML
60 INJECTION, SOLUTION INTRAMUSCULAR; INTRAVENOUS ONCE
Status: COMPLETED | OUTPATIENT
Start: 2024-07-03 | End: 2024-07-03

## 2024-07-03 RX ADMIN — DEXAMETHASONE SODIUM PHOSPHATE 10 MG: 10 INJECTION INTRAMUSCULAR; INTRAVENOUS at 02:18

## 2024-07-03 RX ADMIN — KETOROLAC TROMETHAMINE 60 MG: 30 INJECTION, SOLUTION INTRAMUSCULAR; INTRAVENOUS at 02:17

## 2024-07-09 ENCOUNTER — OFFICE VISIT (OUTPATIENT)
Dept: FAMILY MEDICINE CLINIC | Facility: CLINIC | Age: 29
End: 2024-07-09
Payer: MEDICAID

## 2024-07-09 VITALS
WEIGHT: 293 LBS | HEIGHT: 66 IN | SYSTOLIC BLOOD PRESSURE: 116 MMHG | OXYGEN SATURATION: 96 % | BODY MASS INDEX: 47.09 KG/M2 | HEART RATE: 105 BPM | DIASTOLIC BLOOD PRESSURE: 84 MMHG

## 2024-07-09 DIAGNOSIS — G89.29 CHRONIC LEFT-SIDED LOW BACK PAIN WITH LEFT-SIDED SCIATICA: Primary | ICD-10-CM

## 2024-07-09 DIAGNOSIS — R29.898 LEFT LEG WEAKNESS: ICD-10-CM

## 2024-07-09 DIAGNOSIS — M54.42 CHRONIC LEFT-SIDED LOW BACK PAIN WITH LEFT-SIDED SCIATICA: Primary | ICD-10-CM

## 2024-07-09 PROCEDURE — 99214 OFFICE O/P EST MOD 30 MIN: CPT | Performed by: INTERNAL MEDICINE

## 2024-07-09 PROCEDURE — 1159F MED LIST DOCD IN RCRD: CPT | Performed by: INTERNAL MEDICINE

## 2024-07-09 PROCEDURE — 1125F AMNT PAIN NOTED PAIN PRSNT: CPT | Performed by: INTERNAL MEDICINE

## 2024-07-09 PROCEDURE — 1160F RVW MEDS BY RX/DR IN RCRD: CPT | Performed by: INTERNAL MEDICINE

## 2024-07-09 RX ORDER — GABAPENTIN 300 MG/1
300 CAPSULE ORAL 3 TIMES DAILY
Qty: 90 CAPSULE | Refills: 2 | Status: SHIPPED | OUTPATIENT
Start: 2024-07-09

## 2024-07-09 NOTE — PROGRESS NOTES
Chief Complaint   Patient presents with    Hospital Follow Up Visit   Back pain    HPI:  Shonetta Elaine Davis is a 29 y.o. female who presents today for ER follow-up back pain with left-sided sciatica.    ROS:  Constitutional: no fevers, night sweats or unexplained weight loss  Eyes: no vision changes  ENT: no runny nose, ear pain, sore throat  Cardio: no chest pain, palpitations  Pulm: no shortness of breath, wheezing, or cough  GI: no abdominal pain or changes in bowel movements  : no difficulty urinating  MSK: no difficulty ambulating, no joint pain  Neuro: no weakness, dizziness or headache  Psych: no trouble sleeping  Endo: no change in appetite      Past Medical History:   Diagnosis Date    Anxiety     Asthma     Depression     Hypertension     Low back pain       Family History   Problem Relation Age of Onset    Arthritis Mother     Hypertension Mother     Diabetes Mother     Heart disease Mother     Anxiety disorder Mother     Depression Mother     Early death Mother     Mental illness Mother     Cancer Father     Thyroid disease Father     Depression Father       Social History     Socioeconomic History    Marital status: Single   Tobacco Use    Smoking status: Never     Passive exposure: Yes    Smokeless tobacco: Never   Vaping Use    Vaping status: Never Used   Substance and Sexual Activity    Alcohol use: Yes     Comment: 3 times a week    Drug use: Never    Sexual activity: Yes     Partners: Male     Birth control/protection: Condom      No Known Allergies   Immunization History   Administered Date(s) Administered    DTaP, Unspecified 02/18/1998    HPV Quadrivalent 04/10/2007, 10/09/2007    Hepatitis A 11/07/2018    MCV4 Unspecified 04/10/2007    MMR 02/20/1997    OPV 02/18/1998    Td (TDVAX) 07/07/2005        PE:  Vitals:    07/09/24 1319   BP: 116/84   Pulse: 105   SpO2: 96%      Body mass index is 49.07 kg/m².    Gen Appearance: NAD  HEENT: Normocephalic, PERRLA, no thyromegaly, trache  midline  Heart: RRR, normal S1 and S2, no murmur  Lungs: CTA b/l, no wheezing, no crackles  Abdomen: Soft, non-tender, non-distended, no guarding and BSx4  MSK: Moves all extremities well, normal gait, no peripheral edema  Pulses: Palpable and equal b/l  Lymph nodes: No palpable lymphadenopathy   Neuro: 3/5 left leg weakness, positive straight leg raise      Current Outpatient Medications   Medication Sig Dispense Refill    cyclobenzaprine (FLEXERIL) 10 MG tablet Take 1 tablet by mouth 3 (Three) Times a Day As Needed for Muscle Spasms. 12 tablet 0    diclofenac (VOLTAREN) 50 MG EC tablet Take 1 tablet by mouth 3 (Three) Times a Day. 14 tablet 0    gabapentin (NEURONTIN) 600 MG tablet Take 1 tablet by mouth 3 (Three) Times a Day. (Patient not taking: Reported on 7/9/2024) 90 tablet 2     No current facility-administered medications for this visit.      Recommend updated MRI, worsening symptoms over the past few months with left leg weakness.  Continue anti-inflammatories and muscle relaxer, she would like to resume gabapentin.  CSA and UDS today.  Refer to pain management to establish care.    Diagnoses and all orders for this visit:    1. Chronic left-sided low back pain with left-sided sciatica (Primary)  -     MRI Lumbar Spine Without Contrast; Future    2. Left leg weakness  -     MRI Lumbar Spine Without Contrast; Future         Return in about 3 months (around 10/9/2024) for Annual physical.     Dictated Utilizing Dragon Dictation    Please note that portions of this note were completed with a voice recognition program.    Part of this note may be an electronic transcription/translation of spoken language to printed text using the Dragon Dictation System.

## 2024-11-26 ENCOUNTER — OFFICE VISIT (OUTPATIENT)
Dept: FAMILY MEDICINE CLINIC | Facility: CLINIC | Age: 29
End: 2024-11-26
Payer: MEDICAID

## 2024-11-26 ENCOUNTER — LAB (OUTPATIENT)
Dept: LAB | Facility: HOSPITAL | Age: 29
End: 2024-11-26
Payer: MEDICAID

## 2024-11-26 VITALS — BODY MASS INDEX: 47.09 KG/M2 | HEART RATE: 78 BPM | WEIGHT: 293 LBS | OXYGEN SATURATION: 100 % | HEIGHT: 66 IN

## 2024-11-26 DIAGNOSIS — R32 URINARY INCONTINENCE, UNSPECIFIED TYPE: Primary | ICD-10-CM

## 2024-11-26 DIAGNOSIS — R25.1 TREMORS OF NERVOUS SYSTEM: ICD-10-CM

## 2024-11-26 DIAGNOSIS — Z00.00 HEALTHCARE MAINTENANCE: ICD-10-CM

## 2024-11-26 DIAGNOSIS — R51.9 ACUTE NONINTRACTABLE HEADACHE, UNSPECIFIED HEADACHE TYPE: ICD-10-CM

## 2024-11-26 DIAGNOSIS — M51.360 DEGENERATION OF INTERVERTEBRAL DISC OF LUMBAR REGION WITH DISCOGENIC BACK PAIN: ICD-10-CM

## 2024-11-26 PROBLEM — F43.10 POST-TRAUMATIC STRESS DISORDER, UNSPECIFIED: Status: ACTIVE | Noted: 2024-11-04

## 2024-11-26 PROBLEM — F31.9 BIPOLAR 1 DISORDER: Status: ACTIVE | Noted: 2024-11-04

## 2024-11-26 LAB
BILIRUB BLD-MCNC: NEGATIVE MG/DL
CLARITY, POC: CLEAR
COLOR UR: YELLOW
EXPIRATION DATE: ABNORMAL
GLUCOSE UR STRIP-MCNC: NEGATIVE MG/DL
KETONES UR QL: NEGATIVE
LEUKOCYTE EST, POC: NEGATIVE
Lab: ABNORMAL
NITRITE UR-MCNC: NEGATIVE MG/ML
PH UR: 6 [PH] (ref 5–8)
PROT UR STRIP-MCNC: NEGATIVE MG/DL
RBC # UR STRIP: ABNORMAL /UL
SP GR UR: 1.01 (ref 1–1.03)
UROBILINOGEN UR QL: NORMAL

## 2024-11-26 PROCEDURE — 1159F MED LIST DOCD IN RCRD: CPT

## 2024-11-26 PROCEDURE — 80061 LIPID PANEL: CPT

## 2024-11-26 PROCEDURE — 83036 HEMOGLOBIN GLYCOSYLATED A1C: CPT

## 2024-11-26 PROCEDURE — 99214 OFFICE O/P EST MOD 30 MIN: CPT

## 2024-11-26 PROCEDURE — 84443 ASSAY THYROID STIM HORMONE: CPT

## 2024-11-26 PROCEDURE — 80053 COMPREHEN METABOLIC PANEL: CPT

## 2024-11-26 PROCEDURE — 1125F AMNT PAIN NOTED PAIN PRSNT: CPT

## 2024-11-26 PROCEDURE — 1160F RVW MEDS BY RX/DR IN RCRD: CPT

## 2024-11-26 PROCEDURE — 85025 COMPLETE CBC W/AUTO DIFF WBC: CPT

## 2024-11-26 RX ORDER — PRAZOSIN HYDROCHLORIDE 2 MG/1
CAPSULE ORAL
COMMUNITY
Start: 2024-09-05

## 2024-11-26 RX ORDER — NAPROXEN 500 MG/1
TABLET ORAL
COMMUNITY
Start: 2024-09-05

## 2024-11-26 RX ORDER — TRAZODONE HYDROCHLORIDE 50 MG/1
50 TABLET, FILM COATED ORAL
COMMUNITY
Start: 2024-11-04 | End: 2024-12-04

## 2024-11-26 RX ORDER — HYDROXYZINE PAMOATE 50 MG/1
50 CAPSULE ORAL
COMMUNITY

## 2024-11-26 RX ORDER — CYCLOBENZAPRINE HCL 10 MG
10 TABLET ORAL 3 TIMES DAILY PRN
Qty: 90 TABLET | Refills: 1 | Status: SHIPPED | OUTPATIENT
Start: 2024-11-26

## 2024-11-26 RX ORDER — FLUOXETINE 10 MG/1
10 CAPSULE ORAL EVERY MORNING
COMMUNITY
Start: 2024-11-22

## 2024-11-26 RX ORDER — LUMATEPERONE 42 MG/1
1 CAPSULE ORAL DAILY
COMMUNITY

## 2024-11-26 RX ORDER — BUPROPION HYDROCHLORIDE 150 MG/1
TABLET ORAL
COMMUNITY
Start: 2024-09-05 | End: 2024-11-26 | Stop reason: ALTCHOICE

## 2024-11-26 NOTE — PROGRESS NOTES
"    Office Note     Name: Shonetta Elaine Davis    : 1995     MRN: [id number]     Chief Complaint  Pain (Back, neck, knee pain), Tremors (Dropping things has hand tremors), and Urinary Incontinence    Subjective     History of Present Illness:  Shonetta Elaine Davis is a 29 y.o. female who presents today for multiple acute concerns. She is initially quite anxious because of trouble with check in, but mood improves throughout the visit. She states that she is currently in \"semi hospitalization\" with [facility] [place]. Has had a lot of changes to mental health medications recently. Feels like they are doing better for her. She expresses that she has had trouble prioritizing her physical health in the past because of mental health, and also feels less support from her family because she was brought up not to complain. She states that she is high-functioning so it is not always apparent to others when she is struggling with physical health.     Headaches: Present for a month. Describes is as constant around forehead and behind eyes. Also notes that she is sober for a month. Stopped alcohol and other substances about a month ago. Initially thought of headache as withdrawal symptom, and has not taken anything for this pain. Worries now that it is something else due to duration of headache. Denies photophobia, phonophobia, and vision changes    Back pain: Present for several years, has a diagnosis of DDD, which she understands there is limited options for. Has been prescribed cyclobenzaprine and gabapentin in the past for this, but states that she was self-medicating with illicit substances at the time and therefore cannot adequately evaluate her response to these medications.     Tremors: Present for years. More frequent recently. Present in action, especially while trying to hold things. Has had a referral to neurology before, but never made the appointment. Adds that poor mental health in the past made it hard to follow " up on appointments.      Incontinence: Present for most of her life. Currently going to the bathroom every 45 minutes. Endorses moderate-high volume with every time. She uses poise pads to help. Also has small accidents with laughing/coughing. Denies dysuria, flank pain.  Bladder training      Review of Systems:   Review of Systems   Constitutional:  Negative for chills, fatigue and fever.   HENT:  Negative for congestion.    Respiratory:  Negative for shortness of breath.    Cardiovascular:  Negative for chest pain and leg swelling.   Gastrointestinal:  Negative for abdominal pain, constipation, diarrhea and nausea.   Genitourinary:  Positive for frequency. Negative for difficulty urinating and menstrual problem.   Musculoskeletal:  Positive for arthralgias and back pain.   Skin:  Negative for rash.   Neurological:  Positive for tremors and headaches. Negative for dizziness.        Past Medical History:   Past Medical History:   Diagnosis Date   • Anxiety    • Asthma    • Depression    • Hypertension    • Low back pain        Past Surgical History:   Past Surgical History:   Procedure Laterality Date   • TONSILLECTOMY AND ADENOIDECTOMY         Family History:   Family History   Problem Relation Age of Onset   • Arthritis Mother    • Hypertension Mother    • Diabetes Mother    • Heart disease Mother    • Anxiety disorder Mother    • Depression Mother    • Early death Mother    • Mental illness Mother    • Cancer Father    • Thyroid disease Father    • Depression Father        Social History:   Social History     Socioeconomic History   • Marital status: Single   Tobacco Use   • Smoking status: Never     Passive exposure: Yes   • Smokeless tobacco: Never   Vaping Use   • Vaping status: Never Used   Substance and Sexual Activity   • Alcohol use: Yes     Comment: 3 times a week   • Drug use: Never   • Sexual activity: Yes     Partners: Male     Birth control/protection: Condom       Immunizations:   Immunization  "History   Administered Date(s) Administered   • DTaP, Unspecified 02/18/1998   • HPV Quadrivalent 04/10/2007, 10/09/2007   • Hepatitis A 11/07/2018   • MCV4 Unspecified 04/10/2007   • MMR 02/20/1997   • OPV 02/18/1998   • Td (TDVAX) 07/07/2005        Medications:     Current Outpatient Medications:   •  aspirin-acetaminophen-caffeine (Excedrin Migraine) 250-250-65 MG per tablet, Take 1 tablet by mouth Every 6 (Six) Hours As Needed for Headache for up to 10 days., Disp: 30 tablet, Rfl: 0  •  Caplyta 42 MG capsule, Take 1 capsule by mouth Daily., Disp: , Rfl:   •  FLUoxetine (PROzac) 10 MG capsule, Take 1 capsule by mouth Every Morning., Disp: , Rfl:   •  hydrOXYzine pamoate (VISTARIL) 50 MG capsule, Take 1 capsule by mouth., Disp: , Rfl:   •  naproxen (NAPROSYN) 500 MG tablet, , Disp: , Rfl:   •  prazosin (MINIPRESS) 2 MG capsule, , Disp: , Rfl:   •  traZODone (DESYREL) 50 MG tablet, Take 1 tablet by mouth., Disp: , Rfl:   •  cyclobenzaprine (FLEXERIL) 10 MG tablet, Take 1 tablet by mouth 3 (Three) Times a Day As Needed for Muscle Spasms., Disp: 90 tablet, Rfl: 1    Allergies:   No Known Allergies    Objective     Vital Signs  Pulse 78   Ht 167.6 cm (66\")   Wt (!) 144 kg (318 lb)   SpO2 100%   BMI 51.33 kg/m²   Estimated body mass index is 51.33 kg/m² as calculated from the following:    Height as of this encounter: 167.6 cm (66\").    Weight as of this encounter: 144 kg (318 lb).           Physical Exam  Vitals reviewed.   Constitutional:       General: She is not in acute distress.     Appearance: Normal appearance. She is obese.   HENT:      Head: Normocephalic and atraumatic.      Right Ear: Tympanic membrane normal.      Left Ear: Tympanic membrane normal.      Nose: Nose normal. No congestion.      Mouth/Throat:      Mouth: Mucous membranes are moist.      Pharynx: Oropharynx is clear.   Eyes:      Pupils: Pupils are equal, round, and reactive to light.   Cardiovascular:      Rate and Rhythm: Normal rate and " regular rhythm.      Pulses: Normal pulses.      Heart sounds: No murmur heard.  Pulmonary:      Effort: Pulmonary effort is normal. No respiratory distress.      Breath sounds: Normal breath sounds.   Musculoskeletal:      Cervical back: Normal range of motion.      Lumbar back: Normal. No spasms, tenderness or bony tenderness.      Right lower leg: No edema.      Left lower leg: No edema.   Lymphadenopathy:      Cervical: No cervical adenopathy.   Skin:     General: Skin is warm and dry.      Capillary Refill: Capillary refill takes less than 2 seconds.      Findings: No rash.   Neurological:      General: No focal deficit present.      Mental Status: She is alert.      Motor: Motor function is intact.      Gait: Gait is intact.   Psychiatric:         Mood and Affect: Mood normal.         Thought Content: Thought content normal.          Procedures     Results:  Recent Results (from the past 24 hours)   POCT urinalysis dipstick, automated    Collection Time: 11/26/24  1:13 PM    Specimen: Urine   Result Value Ref Range    Color Yellow Yellow, Straw, Dark Yellow, [place]avita    Clarity, UA Clear Clear    Specific Gravity  1.015 1.005 - 1.030    pH, Urine 6.0 5.0 - 8.0    Leukocytes Negative Negative    Nitrite, UA Negative Negative    Protein, POC Negative Negative mg/dL    Glucose, UA Negative Negative mg/dL    Ketones, UA Negative Negative    Urobilinogen, UA Normal Normal, 0.2 E.U./dL    Bilirubin Negative Negative    Blood, UA Trace (A) Negative    Lot Number [id number]     Expiration Date 1,302,025         Assessment and Plan     Assessment/Plan:  Assessment & Plan  Urinary incontinence, unspecified type  - Portions of patient history suspicious for stress incontinence.   - High volume/high frequency urination is concerning for underlying pathology. Will work up today with A1c. Referral to neurology if neurological component present.   POC dipstick- normal    Orders:  •  POCT urinalysis dipstick,  automated    Acute nonintractable headache, unspecified headache type  - Likely related to recent substance/alcohol cessation and starting new psych medications  - Treat symptomatically with excedrin migraine, will upgrade therapy if not effective at next appointment and consider further neurological workup.    Orders:  •  aspirin-acetaminophen-caffeine (Excedrin Migraine) 250-250-65 MG per tablet; Take 1 tablet by mouth Every 6 (Six) Hours As Needed for Headache for up to 10 days.    Tremors of nervous system    Orders:  •  Ambulatory Referral to Neurology    Healthcare maintenance    Orders:  •  Comprehensive Metabolic Panel; Future  •  CBC & Differential; Future  •  Lipid Panel With / Chol / HDL Ratio; Future  •  TSH Rfx On Abnormal To Free T4; Future  •  Hemoglobin A1c; Future    Degeneration of intervertebral disc of lumbar region with discogenic back pain  - Start cyclobenzaprine, referral to PT  - Consider addition of gabapentin at next appointment.   Orders:  •  Ambulatory Referral to Physical Therapy for Evaluation & Treatment  •  cyclobenzaprine (FLEXERIL) 10 MG tablet; Take 1 tablet by mouth 3 (Three) Times a Day As Needed for Muscle Spasms.        Follow Up  Return in about 4 weeks (around 12/24/2024).        Ashlee Greenwood PA-C   [facility]

## 2024-11-26 NOTE — ASSESSMENT & PLAN NOTE
- Start cyclobenzaprine, referral to PT  - Consider addition of gabapentin at next appointment.   Orders:    Ambulatory Referral to Physical Therapy for Evaluation & Treatment    cyclobenzaprine (FLEXERIL) 10 MG tablet; Take 1 tablet by mouth 3 (Three) Times a Day As Needed for Muscle Spasms.

## 2024-12-17 ENCOUNTER — HOSPITAL ENCOUNTER (EMERGENCY)
Facility: HOSPITAL | Age: 29
Discharge: HOME | End: 2024-12-18
Attending: EMERGENCY MEDICINE
Payer: MEDICAID

## 2024-12-17 DIAGNOSIS — S06.0X0A CONCUSSION WITHOUT LOSS OF CONSCIOUSNESS, INITIAL ENCOUNTER: Primary | ICD-10-CM

## 2024-12-17 PROCEDURE — 99283 EMERGENCY DEPT VISIT LOW MDM: CPT

## 2024-12-17 RX ORDER — TRAZODONE HYDROCHLORIDE 50 MG/1
50 TABLET, FILM COATED ORAL NIGHTLY
COMMUNITY

## 2024-12-18 VITALS
HEART RATE: 77 BPM | BODY MASS INDEX: 47.09 KG/M2 | DIASTOLIC BLOOD PRESSURE: 91 MMHG | TEMPERATURE: 97.5 F | SYSTOLIC BLOOD PRESSURE: 135 MMHG | WEIGHT: 293 LBS | HEIGHT: 66 IN | OXYGEN SATURATION: 99 % | RESPIRATION RATE: 18 BRPM

## 2024-12-18 RX ORDER — BUTALBITAL, ACETAMINOPHEN AND CAFFEINE 50; 325; 40 MG/1; MG/1; MG/1
2 TABLET ORAL ONCE
Status: COMPLETED | OUTPATIENT
Start: 2024-12-18 | End: 2024-12-18

## 2024-12-18 RX ADMIN — BUTALBITAL, ACETAMINOPHEN, AND CAFFEINE 2 TABLET: 325; 50; 40 TABLET ORAL at 00:27

## 2024-12-18 NOTE — FSED PROVIDER NOTE
"Subjective  History of Present Illness:    Patient presents to the emergency department after reportedly fell down 3 stairs on Sunday.  The patient states she was seen at [facility] emergency department had a CT scan of her head, cervical spine and x-rays of her extremities which were all found to be negative.  She states she continues to have a headache.  Denies any vomiting, dizziness.  Is concerned as she feels she just is not getting any better    Nurses Notes reviewed and agree, including vitals, allergies, social history and prior medical history.     REVIEW OF SYSTEMS: All systems reviewed and not pertinent unless noted.  Review of Systems   Neurological:  Positive for headaches.   All other systems reviewed and are negative.      Past Medical History:   Diagnosis Date    Anxiety     Asthma     Depression     Hypertension     Low back pain        Allergies:    Patient has no known allergies.      Past Surgical History:   Procedure Laterality Date    TONSILLECTOMY AND ADENOIDECTOMY           Social History     Socioeconomic History    Marital status: Single   Tobacco Use    Smoking status: Never     Passive exposure: Yes    Smokeless tobacco: Never   Vaping Use    Vaping status: Never Used   Substance and Sexual Activity    Alcohol use: Yes     Comment: 3 times a week    Drug use: Never    Sexual activity: Yes     Partners: Male     Birth control/protection: Condom         Family History   Problem Relation Age of Onset    Arthritis Mother     Hypertension Mother     Diabetes Mother     Heart disease Mother     Anxiety disorder Mother     Depression Mother     Early death Mother     Mental illness Mother     Cancer Father     Thyroid disease Father     Depression Father        Objective  Physical Exam:  /91   Pulse 77   Temp 97.5 °F (36.4 °C) (Oral)   Resp 18   Ht 168 cm (66.14\")   Wt (!) 145 kg (319 lb 10.7 oz)   LMP 11/18/2024 (Approximate)   SpO2 99%   BMI 51.37 kg/m²      Physical " Exam  Vitals and nursing note reviewed.   Constitutional:       General: She is not in acute distress.     Appearance: Normal appearance. She is obese. She is not ill-appearing, toxic-appearing or diaphoretic.   HENT:      Head: Normocephalic and atraumatic.      Mouth/Throat:      Mouth: Mucous membranes are dry.   Eyes:      Extraocular Movements: Extraocular movements intact.      Conjunctiva/sclera: Conjunctivae normal.      Pupils: Pupils are equal, round, and reactive to light.   Neck:      Comments: Mild diffuse posterior cervical spine tenderness.  No step-offs.  Cardiovascular:      Rate and Rhythm: Normal rate and regular rhythm.   Pulmonary:      Effort: Pulmonary effort is normal.   Abdominal:      General: Abdomen is flat. Bowel sounds are normal.      Palpations: Abdomen is soft.      Tenderness: There is no abdominal tenderness. There is no guarding.   Musculoskeletal:         General: Normal range of motion.      Cervical back: Normal range of motion.   Skin:     General: Skin is warm.      Capillary Refill: Capillary refill takes less than 2 seconds.   Neurological:      General: No focal deficit present.      Mental Status: She is alert.   Psychiatric:         Mood and Affect: Mood normal.         Behavior: Behavior normal.         Thought Content: Thought content normal.         Judgment: Judgment normal.         Procedures    ED Course:         Lab Results (last 24 hours)       ** No results found for the last 24 hours. **             No radiology results from the last 24 hrs       MDM  Number of Diagnoses or Management Options  Diagnosis management comments: Patient was evaluated and found to have symptoms consistent with concussion and possible postconcussive syndrome.  She was given Fioricet found to be in improved condition was discharged home        Medications   butalbital-acetaminophen-caffeine (FIORICET, ESGIC) -40 MG per tablet 2 tablet (2 tablets Oral Given 12/18/24 0027)        Data interpreted: Nursing notes reviewed, vital signs reviewed.  Labs independently interpreted by me (CBC, CMP, lipase, UA, troponin, ABG, lactic acid, procalcitonin).  Imaging independently interpreted by me (x-ray, CT scan).  EKG independently interpreted by me.  O2 saturation:    Counseling: Discussed the results above with the patient regarding need for admission or discharge.  Patient understands and agrees plan of care.      -----  ED Disposition       ED Disposition   Discharge    Condition   Stable    Comment   --             Final diagnoses:   Concussion without loss of consciousness, initial encounter      Your Follow-Up Providers       Jose Jean DO. Call in 2 days.    Specialty: Internal Medicine  [address] [place] [address]  [phone]                       Contact information for after-discharge care    Follow-up information has not been specified.                    Your medication list        CONTINUE taking these medications        Instructions Last Dose Given Next Dose Due   Caplyta 42 MG capsule  Generic drug: Lumateperone Tosylate      Take 1 capsule by mouth Daily.       cyclobenzaprine 10 MG tablet  Commonly known as: FLEXERIL      Take 1 tablet by mouth 3 (Three) Times a Day As Needed for Muscle Spasms.       FLUoxetine 10 MG capsule  Commonly known as: PROzac      Take 1 capsule by mouth Every Morning.       hydrOXYzine pamoate 50 MG capsule  Commonly known as: VISTARIL      Take 1 capsule by mouth 3 (Three) Times a Day As Needed.       naproxen 500 MG tablet  Commonly known as: NAPROSYN           prazosin 1 MG capsule  Commonly known as: MINIPRESS      Take 1 capsule by mouth Every Night.       traZODone 50 MG tablet  Commonly known as: DESYREL      Take 1 tablet by mouth Every Night.

## 2024-12-20 ENCOUNTER — OFFICE VISIT (OUTPATIENT)
Dept: FAMILY MEDICINE CLINIC | Facility: CLINIC | Age: 29
End: 2024-12-20
Payer: MEDICAID

## 2024-12-20 VITALS
BODY MASS INDEX: 47.09 KG/M2 | HEART RATE: 95 BPM | OXYGEN SATURATION: 98 % | HEIGHT: 66 IN | WEIGHT: 293 LBS | DIASTOLIC BLOOD PRESSURE: 98 MMHG | SYSTOLIC BLOOD PRESSURE: 138 MMHG

## 2024-12-20 DIAGNOSIS — F07.81 POST CONCUSSIVE SYNDROME: Primary | ICD-10-CM

## 2024-12-20 DIAGNOSIS — F32.89 OTHER DEPRESSION: ICD-10-CM

## 2024-12-20 RX ORDER — FLUOXETINE 10 MG/1
10 CAPSULE ORAL EVERY MORNING
Qty: 30 CAPSULE | Refills: 5 | Status: SHIPPED | OUTPATIENT
Start: 2024-12-20

## 2024-12-20 RX ORDER — IBUPROFEN 600 MG/1
TABLET, FILM COATED ORAL
COMMUNITY
Start: 2024-12-18

## 2024-12-20 NOTE — PROGRESS NOTES
Office Note     Name: Shonetta Elaine Davis    : 1995     MRN: [id number]     Chief Complaint  Hospital Follow Up Visit    Subjective     History of Present Illness:  Shonetta Elaine Davis is a 29 y.o. female who presents today for ED follow up for concussion.     She states she had a fall on  night while descending stairs at home and hit her face and thinks her face may have bounced and she hit the back of her head. She states she sustained some cuts on her upper lip and chin, then decided to proceed to work. She states she successfully worked on  night and then slept for a long time on Monday. She decided Tuesday to report to ED for evaluation due to headache and increased drowsiness. She first reported to [facility], where she completed CT head, cervical spine and xrays of extremities which were negative for acute injury. She was discharged with tylenol and ibuprofen for headaches. She then reported to [facility] ED for further opinion, and was diagnosed with concussion, given Fiorocet for headache in house, and discharged.     Today, she states the headache is constant and unchanging. She has not picked up tylenol/ibuprofen yet for headaches. She admits that it hurts to walk and that neck is also . She denies changes in mentation, memory. She denies nausea, vomiting, dizziness.           Review of Systems:   Review of Systems   Constitutional:  Negative for chills, fatigue and fever.   HENT:  Negative for congestion, ear pain, rhinorrhea, sinus pain and sore throat.    Respiratory:  Negative for cough, shortness of breath and wheezing.    Cardiovascular:  Negative for chest pain.   Musculoskeletal:  Positive for arthralgias and neck pain. Negative for myalgias and neck stiffness.   Neurological:  Positive for headaches. Negative for dizziness.        Past Medical History:   Past Medical History:   Diagnosis Date    Anxiety     Asthma     Depression     Hypertension      Low back pain        Past Surgical History:   Past Surgical History:   Procedure Laterality Date    TONSILLECTOMY AND ADENOIDECTOMY         Family History:   Family History   Problem Relation Age of Onset    Arthritis Mother     Hypertension Mother     Diabetes Mother     Heart disease Mother     Anxiety disorder Mother     Depression Mother     Early death Mother     Mental illness Mother     Cancer Father     Thyroid disease Father     Depression Father        Social History:   Social History     Socioeconomic History    Marital status: Single   Tobacco Use    Smoking status: Never     Passive exposure: Yes    Smokeless tobacco: Never   Vaping Use    Vaping status: Never Used   Substance and Sexual Activity    Alcohol use: Not Currently     Comment: 3 times a week    Drug use: Never    Sexual activity: Not Currently     Partners: Male     Birth control/protection: Condom       Immunizations:   Immunization History   Administered Date(s) Administered    DTaP, Unspecified 02/18/1998    HPV Quadrivalent 04/10/2007, 10/09/2007    Hepatitis A 11/07/2018    MCV4 Unspecified 04/10/2007    MMR 02/20/1997    OPV 02/18/1998    Td (TDVAX) 07/07/2005        Medications:     Current Outpatient Medications:     Caplyta 42 MG capsule, Take 1 capsule by mouth Daily., Disp: , Rfl:     cyclobenzaprine (FLEXERIL) 10 MG tablet, Take 1 tablet by mouth 3 (Three) Times a Day As Needed for Muscle Spasms., Disp: 90 tablet, Rfl: 1    FLUoxetine (PROzac) 10 MG capsule, Take 1 capsule by mouth Every Morning., Disp: 30 capsule, Rfl: 5    hydrOXYzine pamoate (VISTARIL) 50 MG capsule, Take 1 capsule by mouth 3 (Three) Times a Day As Needed., Disp: , Rfl:     ibuprofen (ADVIL,MOTRIN) 600 MG tablet, , Disp: , Rfl:     naproxen (NAPROSYN) 500 MG tablet, , Disp: , Rfl:     prazosin (MINIPRESS) 1 MG capsule, Take 1 capsule by mouth Every Night., Disp: , Rfl:     traZODone (DESYREL) 50 MG tablet, Take 1 tablet by mouth Every Night., Disp: , Rfl:  "    Allergies:   No Known Allergies    Objective     Vital Signs  /98 (BP Location: Left arm, Patient Position: Sitting, Cuff Size: Large Adult)   Pulse 95   Ht 168 cm (66.14\")   Wt (!) 145 kg (319 lb)   SpO2 98%   BMI 51.27 kg/m²   Estimated body mass index is 51.27 kg/m² as calculated from the following:    Height as of this encounter: 168 cm (66.14\").    Weight as of this encounter: 145 kg (319 lb).           Physical Exam  Vitals reviewed.   Constitutional:       General: She is not in acute distress.     Appearance: Normal appearance. She is not ill-appearing.   HENT:      Head: Normocephalic and atraumatic.      Nose: Nose normal. No congestion.      Mouth/Throat:      Mouth: Mucous membranes are moist. Mucous membranes are dry.      Pharynx: No oropharyngeal exudate or posterior oropharyngeal erythema.   Eyes:      Extraocular Movements: Extraocular movements intact.      Pupils: Pupils are equal, round, and reactive to light.   Cardiovascular:      Rate and Rhythm: Normal rate and regular rhythm.      Pulses: Normal pulses.      Heart sounds: Normal heart sounds. No murmur heard.  Pulmonary:      Effort: Pulmonary effort is normal. No respiratory distress.      Breath sounds: Normal breath sounds. No wheezing or rales.   Lymphadenopathy:      Cervical: No cervical adenopathy.   Neurological:      Mental Status: She is alert.      Cranial Nerves: Cranial nerves 2-12 are intact.      Gait: Gait normal.          Procedures     Results:  No results found for this or any previous visit (from the past 24 hours).     Assessment and Plan     Assessment/Plan:  Assessment & Plan  Post concussive syndrome  Educated patient on sign/symptoms of post concussive symdrome including expected symptom resolution within 7 days of injury. Advised patient to start tylenol/ibuprofen for treatment of headache. Discussed importance of mental/physical rest following fall.   Informed patient that if headache is not improved " with tylenol/ibuprofen or not resolved by 7 day post- injury that we can consider upgrading headache treatment, or pursuing brain MRI for evaluation of subacute mild traumatic brain injury.   Patient is not demonstrating red flag signs of brain injury including nausea, vomiting, vision changes, somnolence, or sleep disturbance.        Other depression    Orders:    FLUoxetine (PROzac) 10 MG capsule; Take 1 capsule by mouth Every Morning.        Follow Up: with me on 12/31/2024        Ashlee Greenwood PA-C   [facility]

## 2024-12-20 NOTE — PROGRESS NOTES
Office Note     Name: Shonetta Elaine Davis    : 1995     MRN: [id number]     Chief Complaint  Hospital Follow Up Visit    Subjective     History of Present Illness:  Shonetta Elaine Davis is a 29 y.o. female who presents today for ***      Fall-  night   ED- Tuesday (had trouble waking up that day)          Review of Systems:   Review of Systems     Past Medical History:   Past Medical History:   Diagnosis Date   • Anxiety    • Asthma    • Depression    • Hypertension    • Low back pain        Past Surgical History:   Past Surgical History:   Procedure Laterality Date   • TONSILLECTOMY AND ADENOIDECTOMY         Family History:   Family History   Problem Relation Age of Onset   • Arthritis Mother    • Hypertension Mother    • Diabetes Mother    • Heart disease Mother    • Anxiety disorder Mother    • Depression Mother    • Early death Mother    • Mental illness Mother    • Cancer Father    • Thyroid disease Father    • Depression Father        Social History:   Social History     Socioeconomic History   • Marital status: Single   Tobacco Use   • Smoking status: Never     Passive exposure: Yes   • Smokeless tobacco: Never   Vaping Use   • Vaping status: Never Used   Substance and Sexual Activity   • Alcohol use: Not Currently     Comment: 3 times a week   • Drug use: Never   • Sexual activity: Not Currently     Partners: Male     Birth control/protection: Condom       Immunizations:   Immunization History   Administered Date(s) Administered   • DTaP, Unspecified 1998   • HPV Quadrivalent 04/10/2007, 10/09/2007   • Hepatitis A 2018   • MCV4 Unspecified 04/10/2007   • MMR 1997   • OPV 1998   • Td (TDVAX) 2005        Medications:     Current Outpatient Medications:   •  Caplyta 42 MG capsule, Take 1 capsule by mouth Daily., Disp: , Rfl:   •  cyclobenzaprine (FLEXERIL) 10 MG tablet, Take 1 tablet by mouth 3 (Three) Times a Day As Needed for Muscle Spasms., Disp: 90 tablet,  "Rfl: 1  •  FLUoxetine (PROzac) 10 MG capsule, Take 1 capsule by mouth Every Morning., Disp: , Rfl:   •  hydrOXYzine pamoate (VISTARIL) 50 MG capsule, Take 1 capsule by mouth 3 (Three) Times a Day As Needed., Disp: , Rfl:   •  ibuprofen (ADVIL,MOTRIN) 600 MG tablet, , Disp: , Rfl:   •  naproxen (NAPROSYN) 500 MG tablet, , Disp: , Rfl:   •  prazosin (MINIPRESS) 1 MG capsule, Take 1 capsule by mouth Every Night., Disp: , Rfl:   •  traZODone (DESYREL) 50 MG tablet, Take 1 tablet by mouth Every Night., Disp: , Rfl:     Allergies:   No Known Allergies    Objective     Vital Signs  /98 (BP Location: Left arm, Patient Position: Sitting, Cuff Size: Large Adult)   Pulse 95   Ht 168 cm (66.14\")   Wt (!) 145 kg (319 lb)   SpO2 98%   BMI 51.27 kg/m²   Estimated body mass index is 51.27 kg/m² as calculated from the following:    Height as of this encounter: 168 cm (66.14\").    Weight as of this encounter: 145 kg (319 lb).    {Class 3 Severe Obesity (BMI >=40). (Optional):38403}       Physical Exam     Procedures     Results:  No results found for this or any previous visit (from the past 24 hours).     Assessment and Plan     Assessment/Plan:  Assessment & Plan        Follow Up  No follow-ups on file.    {2021TIMESPENTOPTIONAL (Optional):17959}    Ashlee Greenwood PA-C   [facility]  "

## 2024-12-31 ENCOUNTER — LAB (OUTPATIENT)
Dept: LAB | Facility: HOSPITAL | Age: 29
End: 2024-12-31
Payer: MEDICAID

## 2024-12-31 ENCOUNTER — OFFICE VISIT (OUTPATIENT)
Dept: FAMILY MEDICINE CLINIC | Facility: CLINIC | Age: 29
End: 2024-12-31
Payer: MEDICAID

## 2024-12-31 VITALS
DIASTOLIC BLOOD PRESSURE: 90 MMHG | HEART RATE: 74 BPM | BODY MASS INDEX: 47.09 KG/M2 | OXYGEN SATURATION: 98 % | WEIGHT: 293 LBS | SYSTOLIC BLOOD PRESSURE: 136 MMHG | HEIGHT: 66 IN

## 2024-12-31 DIAGNOSIS — N91.2 AMENORRHEA: ICD-10-CM

## 2024-12-31 DIAGNOSIS — R53.83 OTHER FATIGUE: ICD-10-CM

## 2024-12-31 DIAGNOSIS — G89.29 CHRONIC LEFT-SIDED LOW BACK PAIN WITH LEFT-SIDED SCIATICA: ICD-10-CM

## 2024-12-31 DIAGNOSIS — R73.03 PREDIABETES: Primary | ICD-10-CM

## 2024-12-31 DIAGNOSIS — M54.42 CHRONIC LEFT-SIDED LOW BACK PAIN WITH LEFT-SIDED SCIATICA: ICD-10-CM

## 2024-12-31 LAB
ALBUMIN SERPL-MCNC: 3.9 G/DL (ref 3.5–5.2)
ALBUMIN/GLOB SERPL: 1.1 G/DL
ALP SERPL-CCNC: 78 U/L (ref 39–117)
ALT SERPL W P-5'-P-CCNC: 20 U/L (ref 1–33)
ANION GAP SERPL CALCULATED.3IONS-SCNC: 9 MMOL/L (ref 5–15)
AST SERPL-CCNC: 26 U/L (ref 1–32)
B-HCG UR QL: NEGATIVE
BASOPHILS # BLD AUTO: 0.05 10*3/MM3 (ref 0–0.2)
BASOPHILS NFR BLD AUTO: 0.8 % (ref 0–1.5)
BILIRUB SERPL-MCNC: <0.2 MG/DL (ref 0–1.2)
BUN SERPL-MCNC: 11 MG/DL (ref 6–20)
BUN/CREAT SERPL: 12.8 (ref 7–25)
CALCIUM SPEC-SCNC: 9.1 MG/DL (ref 8.6–10.5)
CHLORIDE SERPL-SCNC: 102 MMOL/L (ref 98–107)
CO2 SERPL-SCNC: 26 MMOL/L (ref 22–29)
CREAT SERPL-MCNC: 0.86 MG/DL (ref 0.57–1)
DEPRECATED RDW RBC AUTO: 37.6 FL (ref 37–54)
EGFRCR SERPLBLD CKD-EPI 2021: 93.3 ML/MIN/1.73
EOSINOPHIL # BLD AUTO: 0.22 10*3/MM3 (ref 0–0.4)
EOSINOPHIL NFR BLD AUTO: 3.4 % (ref 0.3–6.2)
ERYTHROCYTE [DISTWIDTH] IN BLOOD BY AUTOMATED COUNT: 13 % (ref 12.3–15.4)
EXPIRATION DATE: ABNORMAL
GLOBULIN UR ELPH-MCNC: 3.6 GM/DL
GLUCOSE SERPL-MCNC: 95 MG/DL (ref 65–99)
HCT VFR BLD AUTO: 38.9 % (ref 34–46.6)
HGB BLD-MCNC: 13 G/DL (ref 12–15.9)
IMM GRANULOCYTES # BLD AUTO: 0.01 10*3/MM3 (ref 0–0.05)
IMM GRANULOCYTES NFR BLD AUTO: 0.2 % (ref 0–0.5)
INTERNAL NEGATIVE CONTROL: ABNORMAL
INTERNAL POSITIVE CONTROL: NEGATIVE
IRON 24H UR-MRATE: 29 MCG/DL (ref 37–145)
LYMPHOCYTES # BLD AUTO: 2.15 10*3/MM3 (ref 0.7–3.1)
LYMPHOCYTES NFR BLD AUTO: 33.3 % (ref 19.6–45.3)
Lab: ABNORMAL
MCH RBC QN AUTO: 27 PG (ref 26.6–33)
MCHC RBC AUTO-ENTMCNC: 33.4 G/DL (ref 31.5–35.7)
MCV RBC AUTO: 80.9 FL (ref 79–97)
MONOCYTES # BLD AUTO: 0.62 10*3/MM3 (ref 0.1–0.9)
MONOCYTES NFR BLD AUTO: 9.6 % (ref 5–12)
NEUTROPHILS NFR BLD AUTO: 3.4 10*3/MM3 (ref 1.7–7)
NEUTROPHILS NFR BLD AUTO: 52.7 % (ref 42.7–76)
NRBC BLD AUTO-RTO: 0 /100 WBC (ref 0–0.2)
PLATELET # BLD AUTO: 246 10*3/MM3 (ref 140–450)
PMV BLD AUTO: 10.1 FL (ref 6–12)
POTASSIUM SERPL-SCNC: 3.7 MMOL/L (ref 3.5–5.2)
PROT SERPL-MCNC: 7.5 G/DL (ref 6–8.5)
RBC # BLD AUTO: 4.81 10*6/MM3 (ref 3.77–5.28)
SODIUM SERPL-SCNC: 137 MMOL/L (ref 136–145)
VIT B12 BLD-MCNC: 442 PG/ML (ref 211–946)
WBC NRBC COR # BLD AUTO: 6.45 10*3/MM3 (ref 3.4–10.8)

## 2024-12-31 PROCEDURE — 84403 ASSAY OF TOTAL TESTOSTERONE: CPT

## 2024-12-31 PROCEDURE — 81025 URINE PREGNANCY TEST: CPT

## 2024-12-31 PROCEDURE — 1125F AMNT PAIN NOTED PAIN PRSNT: CPT

## 2024-12-31 PROCEDURE — 36415 COLL VENOUS BLD VENIPUNCTURE: CPT

## 2024-12-31 PROCEDURE — 85025 COMPLETE CBC W/AUTO DIFF WBC: CPT

## 2024-12-31 PROCEDURE — 83540 ASSAY OF IRON: CPT

## 2024-12-31 PROCEDURE — 80053 COMPREHEN METABOLIC PANEL: CPT

## 2024-12-31 PROCEDURE — 99214 OFFICE O/P EST MOD 30 MIN: CPT

## 2024-12-31 PROCEDURE — 82652 VIT D 1 25-DIHYDROXY: CPT

## 2024-12-31 PROCEDURE — 1159F MED LIST DOCD IN RCRD: CPT

## 2024-12-31 PROCEDURE — 1160F RVW MEDS BY RX/DR IN RCRD: CPT

## 2024-12-31 PROCEDURE — 82607 VITAMIN B-12: CPT

## 2024-12-31 PROCEDURE — 84402 ASSAY OF FREE TESTOSTERONE: CPT

## 2024-12-31 NOTE — ASSESSMENT & PLAN NOTE
- Educated patient on importance of decreasing carbohydrate intake and favoring complex carbs to simple carbs. Advised patient increase physical activity with goal of 150 min/week.   - Start Metformin    Orders:    Ambulatory Referral to Obstetrics / Gynecology    metFORMIN (GLUCOPHAGE) 850 MG tablet; Take 1 tablet by mouth Daily With Breakfast.

## 2024-12-31 NOTE — PROGRESS NOTES
Office Note     Name: Shonetta Elaine Davis    : 1995     MRN: [id number]     Chief Complaint  Back Pain (Pt. States she feels worse since last visit. Pt. States muscle relaxer are not work anymore. Pt States the Pain Never goes away.  )    Subjective     History of Present Illness:  Shonetta Elaine Davis is a 29 y.o. female who presents today for multiple complaints.     Back pain: She states that her back always hurts, pain never goes away. Has been taking cyclobenzaprine and states it only mildly helps symptoms. She states her lower back is the worst. She particularly concerned about additional injury to her back since fall down the stairs on 2024. ED completed imaging of her head and neck, but did not image complete spine. She states that she has trouble ambulating some days due to back pain. Dr. Jean ordered an MRI of patient's back in July, but she has not scheduled this yet.     Fatigue: patient states she has been dealing with worse fatigue lately. States that she has trouble waking up in the morning and spends most of her time outside of work sleeping and napping. 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     Menstrual cycle: Patient states that she has never had a normal period, but generally has at least one heavy period every 3-4 months. However, she expresses concern that she has only had one day of light menstrual bleeding in the past 4 months. She has not had pregnancy test.     Prediabetes: Last labwork showed A1c of 5.8%, putting patient in prediabetic range. She is interested in pharmacotherapy in addition to diet and exercise. Patient states it is hard to get exercise/physical activity right now due to her fatigue and back pain.         Review of Systems:   Review of Systems   Constitutional:  Positive for fatigue. Negative for chills and fever.   HENT:  Negative for congestion.    Respiratory:  Negative for shortness of breath.     Cardiovascular:  Negative for chest pain and leg swelling.   Gastrointestinal:  Negative for abdominal pain, constipation, diarrhea and nausea.   Genitourinary:  Negative for difficulty urinating and menstrual problem.   Musculoskeletal:  Positive for back pain. Negative for arthralgias.   Skin:  Negative for rash.   Neurological:  Positive for tremors. Negative for dizziness and headaches.        Past Medical History:   Past Medical History:   Diagnosis Date    Anxiety     Asthma     Depression     Hypertension     Low back pain        Past Surgical History:   Past Surgical History:   Procedure Laterality Date    TONSILLECTOMY AND ADENOIDECTOMY         Family History:   Family History   Problem Relation Age of Onset    Arthritis Mother     Hypertension Mother     Diabetes Mother     Heart disease Mother     Anxiety disorder Mother     Depression Mother     Early death Mother     Mental illness Mother     Cancer Father     Thyroid disease Father     Depression Father        Social History:   Social History     Socioeconomic History    Marital status: Single   Tobacco Use    Smoking status: Never     Passive exposure: Yes    Smokeless tobacco: Never   Vaping Use    Vaping status: Never Used   Substance and Sexual Activity    Alcohol use: Not Currently     Comment: 3 times a week    Drug use: Never    Sexual activity: Not Currently     Partners: Male     Birth control/protection: Condom       Immunizations:   Immunization History   Administered Date(s) Administered    DTaP, Unspecified 02/18/1998    HPV Quadrivalent 04/10/2007, 10/09/2007    Hepatitis A 11/07/2018    MCV4 Unspecified 04/10/2007    MMR 02/20/1997    OPV 02/18/1998    Td (TDVAX) 07/07/2005        Medications:     Current Outpatient Medications:     Caplyta 42 MG capsule, Take 1 capsule by mouth Daily., Disp: , Rfl:     cyclobenzaprine (FLEXERIL) 10 MG tablet, Take 1 tablet by mouth 3 (Three) Times a Day As Needed for Muscle Spasms., Disp: 90 tablet,  "Rfl: 1    FLUoxetine (PROzac) 10 MG capsule, Take 1 capsule by mouth Every Morning., Disp: 30 capsule, Rfl: 5    hydrOXYzine pamoate (VISTARIL) 50 MG capsule, Take 1 capsule by mouth 3 (Three) Times a Day As Needed., Disp: , Rfl:     ibuprofen (ADVIL,MOTRIN) 600 MG tablet, , Disp: , Rfl:     naproxen (NAPROSYN) 500 MG tablet, , Disp: , Rfl:     prazosin (MINIPRESS) 1 MG capsule, Take 1 capsule by mouth Every Night., Disp: , Rfl:     traZODone (DESYREL) 50 MG tablet, Take 1 tablet by mouth Every Night., Disp: , Rfl:     ferrous sulfate 325 (65 FE) MG EC tablet, Take 1 tablet by mouth Every Other Day for 36 days., Disp: 18 tablet, Rfl: 0    metFORMIN (GLUCOPHAGE) 850 MG tablet, Take 1 tablet by mouth Daily With Breakfast., Disp: 30 tablet, Rfl: 0    Allergies:   No Known Allergies    Objective     Vital Signs  /90   Pulse 74   Ht 168 cm (66.14\")   Wt (!) 146 kg (320 lb 12.8 oz)   SpO2 98%   BMI 51.56 kg/m²   Estimated body mass index is 51.56 kg/m² as calculated from the following:    Height as of this encounter: 168 cm (66.14\").    Weight as of this encounter: 146 kg (320 lb 12.8 oz).           Physical Exam  Vitals reviewed.   Constitutional:       General: She is not in acute distress.     Appearance: Normal appearance.   Cardiovascular:      Rate and Rhythm: Normal rate and regular rhythm.      Pulses: Normal pulses.      Heart sounds: No murmur heard.  Pulmonary:      Effort: Pulmonary effort is normal. No respiratory distress.      Breath sounds: Normal breath sounds.   Musculoskeletal:      Cervical back: Normal range of motion.      Thoracic back: No bony tenderness.      Lumbar back: No bony tenderness.   Lymphadenopathy:      Cervical: No cervical adenopathy.   Skin:     General: Skin is warm and dry.      Capillary Refill: Capillary refill takes less than 2 seconds.      Findings: No rash.   Neurological:      General: No focal deficit present.      Mental Status: She is alert.   Psychiatric:    "      Mood and Affect: Mood normal.         Thought Content: Thought content normal.          Procedures     Results:  Lab Results   Component Value Date    POCPREGUR Negative 12/31/2024         Assessment and Plan     Assessment/Plan:  Assessment & Plan  Prediabetes  - Educated patient on importance of decreasing carbohydrate intake and favoring complex carbs to simple carbs. Advised patient increase physical activity with goal of 150 min/week.   - Start Metformin    Orders:    Ambulatory Referral to Obstetrics / Gynecology    metFORMIN (GLUCOPHAGE) 850 MG tablet; Take 1 tablet by mouth Daily With Breakfast.    Other fatigue    Orders:    CBC and Differential; Future    Comprehensive metabolic panel; Future    Iron; Future    Testosterone, free, total; Future    Vitamin B12; Future    Vitamin D 1,25 dihydroxy; Future    Ambulatory Referral to Obstetrics / Gynecology    Amenorrhea    Orders:    POCT pregnancy, urine    Ambulatory Referral to Obstetrics / Gynecology    Chronic left-sided low back pain with left-sided sciatica  Advised patient to call radiology to schedule the back MRI that was ordered in July.            Follow Up  Return in about 4 weeks (around 1/28/2025) for Recheck.        Ashlee Greenwood PA-C   [facility] [place] [facility]

## 2025-01-02 ENCOUNTER — TELEPHONE (OUTPATIENT)
Dept: FAMILY MEDICINE CLINIC | Facility: CLINIC | Age: 30
End: 2025-01-02
Payer: MEDICAID

## 2025-01-02 DIAGNOSIS — E61.1 IRON DEFICIENCY: Primary | ICD-10-CM

## 2025-01-02 RX ORDER — FERROUS SULFATE 325(65) MG
325 TABLET, DELAYED RELEASE (ENTERIC COATED) ORAL EVERY OTHER DAY
Qty: 18 TABLET | Refills: 0 | Status: SHIPPED | OUTPATIENT
Start: 2025-01-02 | End: 2025-02-07

## 2025-01-02 NOTE — TELEPHONE ENCOUNTER
Left VM for patient about lab results.     OK FOR HUB TO RELAY:   Your iron level was low, so I have called in a 6 week course of iron supplementation. Please take it Monday, Wednesday, Friday. I have also ordered repeat iron studies to be done after you finish this iron course.

## 2025-01-04 LAB
1,25(OH)2D SERPL-MCNC: 28.3 PG/ML (ref 24.8–81.5)
TESTOST FREE SERPL-MCNC: 0.6 PG/ML (ref 0–4.2)
TESTOST SERPL-MCNC: 23 NG/DL (ref 13–71)

## 2025-02-05 ENCOUNTER — OFFICE VISIT (OUTPATIENT)
Dept: FAMILY MEDICINE CLINIC | Facility: CLINIC | Age: 30
End: 2025-02-05
Payer: COMMERCIAL

## 2025-02-05 VITALS
OXYGEN SATURATION: 97 % | SYSTOLIC BLOOD PRESSURE: 126 MMHG | WEIGHT: 293 LBS | BODY MASS INDEX: 47.09 KG/M2 | HEART RATE: 96 BPM | DIASTOLIC BLOOD PRESSURE: 82 MMHG | HEIGHT: 66 IN

## 2025-02-05 DIAGNOSIS — Z00.00 PREVENTATIVE HEALTH CARE: Primary | ICD-10-CM

## 2025-02-05 DIAGNOSIS — G47.19 EXCESSIVE DAYTIME SLEEPINESS: ICD-10-CM

## 2025-02-05 DIAGNOSIS — M54.50 CHRONIC LOW BACK PAIN, UNSPECIFIED BACK PAIN LATERALITY, UNSPECIFIED WHETHER SCIATICA PRESENT: ICD-10-CM

## 2025-02-05 DIAGNOSIS — R06.83 SNORING: ICD-10-CM

## 2025-02-05 DIAGNOSIS — R73.03 PREDIABETES: ICD-10-CM

## 2025-02-05 DIAGNOSIS — G89.29 CHRONIC LOW BACK PAIN, UNSPECIFIED BACK PAIN LATERALITY, UNSPECIFIED WHETHER SCIATICA PRESENT: ICD-10-CM

## 2025-02-05 DIAGNOSIS — Z12.4 CERVICAL CANCER SCREENING: ICD-10-CM

## 2025-02-05 DIAGNOSIS — R41.3 MEMORY LOSS: ICD-10-CM

## 2025-02-05 LAB
EXPIRATION DATE: ABNORMAL
HBA1C MFR BLD: 5.8 % (ref 4.5–5.7)
Lab: ABNORMAL

## 2025-02-05 PROCEDURE — 99395 PREV VISIT EST AGE 18-39: CPT | Performed by: INTERNAL MEDICINE

## 2025-02-05 PROCEDURE — 83036 HEMOGLOBIN GLYCOSYLATED A1C: CPT | Performed by: INTERNAL MEDICINE

## 2025-02-05 PROCEDURE — 2014F MENTAL STATUS ASSESS: CPT | Performed by: INTERNAL MEDICINE

## 2025-02-05 PROCEDURE — 1159F MED LIST DOCD IN RCRD: CPT | Performed by: INTERNAL MEDICINE

## 2025-02-05 PROCEDURE — 3044F HG A1C LEVEL LT 7.0%: CPT | Performed by: INTERNAL MEDICINE

## 2025-02-05 PROCEDURE — 1125F AMNT PAIN NOTED PAIN PRSNT: CPT | Performed by: INTERNAL MEDICINE

## 2025-02-05 PROCEDURE — 1160F RVW MEDS BY RX/DR IN RCRD: CPT | Performed by: INTERNAL MEDICINE

## 2025-02-05 NOTE — PROGRESS NOTES
Chief Complaint   Patient presents with    Annual Exam     A1C complete as well    Headache     Appt for neurologist is in March       HPI:  Shonetta Elaine Davis is a 30 y.o. female who presents today for annual exam.  Continues to have headache and memory loss.  Has an appointment with neurology next month.  Admits to morning headaches, excessive daytime sleepiness and witnessed apnea events.    ROS:  Constitutional: no fevers, night sweats or unexplained weight loss  Eyes: no vision changes  ENT: no runny nose, ear pain, sore throat  Cardio: no chest pain, palpitations  Pulm: no shortness of breath, wheezing, or cough  GI: no abdominal pain or changes in bowel movements  : no difficulty urinating  MSK: no difficulty ambulating, no joint pain  Neuro: no weakness, dizziness or headache  Psych: no trouble sleeping  Endo: no change in appetite      Past Medical History:   Diagnosis Date    Anxiety     Asthma     Depression     Hypertension     Low back pain       Family History   Problem Relation Age of Onset    Arthritis Mother     Hypertension Mother     Diabetes Mother     Heart disease Mother     Anxiety disorder Mother     Depression Mother     Early death Mother     Mental illness Mother     Cancer Father     Thyroid disease Father     Depression Father       Social History     Socioeconomic History    Marital status: Single   Tobacco Use    Smoking status: Never     Passive exposure: Yes    Smokeless tobacco: Never   Vaping Use    Vaping status: Never Used   Substance and Sexual Activity    Alcohol use: Not Currently     Comment: 3 times a week    Drug use: Never    Sexual activity: Not Currently     Partners: Male     Birth control/protection: Condom      No Known Allergies   Immunization History   Administered Date(s) Administered    DTaP, Unspecified 02/18/1998    HPV Quadrivalent 04/10/2007, 10/09/2007    Hepatitis A 11/07/2018    MCV4 Unspecified 04/10/2007    MMR 02/20/1997    OPV 02/18/1998    Td  (TDVAX) 07/07/2005        PE:  Vitals:    02/05/25 1305   BP: 126/82   Pulse: 96   SpO2: 97%      Body mass index is 50.43 kg/m².    Gen Appearance: NAD  HEENT: Normocephalic, PERRLA, no thyromegaly, trache midline  Heart: RRR, normal S1 and S2, no murmur  Lungs: CTA b/l, no wheezing, no crackles  Abdomen: Soft, non-tender, non-distended, no guarding and BSx4  MSK: Moves all extremities well, normal gait, no peripheral edema  Pulses: Palpable and equal b/l  Lymph nodes: No palpable lymphadenopathy   Neuro: No focal deficits      Current Outpatient Medications   Medication Sig Dispense Refill    Caplyta 42 MG capsule Take 1 capsule by mouth Daily.      cyclobenzaprine (FLEXERIL) 10 MG tablet Take 1 tablet by mouth 3 (Three) Times a Day As Needed for Muscle Spasms. 90 tablet 1    ferrous sulfate 325 (65 FE) MG EC tablet Take 1 tablet by mouth Every Other Day for 36 days. 18 tablet 0    FLUoxetine (PROzac) 10 MG capsule Take 1 capsule by mouth Every Morning. 30 capsule 5    hydrOXYzine pamoate (VISTARIL) 50 MG capsule Take 1 capsule by mouth 3 (Three) Times a Day As Needed.      ibuprofen (ADVIL,MOTRIN) 600 MG tablet       metFORMIN (GLUCOPHAGE) 850 MG tablet Take 1 tablet by mouth Daily With Breakfast. 30 tablet 0    naproxen (NAPROSYN) 500 MG tablet       prazosin (MINIPRESS) 1 MG capsule Take 1 capsule by mouth Every Night.      traZODone (DESYREL) 50 MG tablet Take 1 tablet by mouth Every Night.       No current facility-administered medications for this visit.        Diagnoses and all orders for this visit:    1. Preventative health care (Primary)  Counseled on healthy weight, nutrition, physical activity, cancer screening, and immunizations.    2. Prediabetes  -     POC Glycosylated Hemoglobin (Hb A1C)    3. Excessive daytime sleepiness  Recommend checking home sleep study due to symptoms mentioned in HPI.  4. Snoring    5. Memory loss  -     MRI Brain Without Contrast; Future    6. Chronic low back pain,  unspecified back pain laterality, unspecified whether sciatica present  -     Ambulatory Referral to Physical Therapy for Evaluation & Treatment    7. Cervical cancer screening  -     Ambulatory Referral to Gynecology         Return in about 3 months (around 5/5/2025) for a1c.     Dictated Utilizing Dragon Dictation    Please note that portions of this note were completed with a voice recognition program.    Part of this note may be an electronic transcription/translation of spoken language to printed text using the Dragon Dictation System.

## 2025-02-19 DIAGNOSIS — M51.360 DEGENERATION OF INTERVERTEBRAL DISC OF LUMBAR REGION WITH DISCOGENIC BACK PAIN: ICD-10-CM

## 2025-02-19 RX ORDER — CYCLOBENZAPRINE HCL 10 MG
10 TABLET ORAL 3 TIMES DAILY PRN
Qty: 270 TABLET | Refills: 0 | Status: SHIPPED | OUTPATIENT
Start: 2025-02-19

## 2025-02-26 ENCOUNTER — HOSPITAL ENCOUNTER (OUTPATIENT)
Facility: HOSPITAL | Age: 30
Discharge: HOME | End: 2025-02-26
Admitting: INTERNAL MEDICINE
Payer: COMMERCIAL

## 2025-02-26 DIAGNOSIS — R41.3 MEMORY LOSS: ICD-10-CM

## 2025-02-26 PROCEDURE — 70551 MRI BRAIN STEM W/O DYE: CPT

## 2025-03-03 NOTE — TELEPHONE ENCOUNTER
Rx Refill Note  Requested Prescriptions     Pending Prescriptions Disp Refills    ibuprofen (ADVIL,MOTRIN) 600 MG tablet        Last office visit with prescribing clinician: 2/5/2025   Last telemedicine visit with prescribing clinician: Visit date not found   Next office visit with prescribing clinician: 5/7/2025                         Would you like a call back once the refill request has been completed: [] Yes [] No    If the office needs to give you a call back, can they leave a voicemail: [] Yes [] No    Jody Edmond MA  03/03/25, 08:51 EST

## 2025-03-04 RX ORDER — IBUPROFEN 600 MG/1
600 TABLET, FILM COATED ORAL DAILY PRN
Qty: 30 TABLET | Refills: 0 | Status: SHIPPED | OUTPATIENT
Start: 2025-03-04

## 2025-03-05 ENCOUNTER — OFFICE VISIT (OUTPATIENT)
Dept: NEUROLOGY | Facility: CLINIC | Age: 30
End: 2025-03-05
Payer: COMMERCIAL

## 2025-03-05 VITALS
SYSTOLIC BLOOD PRESSURE: 116 MMHG | BODY MASS INDEX: 47.09 KG/M2 | HEART RATE: 100 BPM | WEIGHT: 293 LBS | OXYGEN SATURATION: 97 % | HEIGHT: 66 IN | DIASTOLIC BLOOD PRESSURE: 72 MMHG

## 2025-03-05 DIAGNOSIS — R25.1 TREMORS OF NERVOUS SYSTEM: Primary | ICD-10-CM

## 2025-03-05 NOTE — PROGRESS NOTES
Subjective   Patient ID: Shonetta Elaine Davis is a 30 y.o. female     Chief Complaint   Patient presents with    Tremors        History of Present Illness    30 y.o. female referred by Ashlee Greenwood PA-C for tremors.     MRI Brain, my review of films, 2/26/25 few punctate T2 hyper intensities.     Tremor in hands.  Multiple times a day.  Started after a fall in Dec 31, 2024,    Occasionally drops object.        Reviewed medical records:    C/O HA and memory loss.      HA constant around forehead and behind eyes.      Action tremors.    CBC,CMP, TSH NCS  A1C 5.8  B12 442   Past Medical History:   Diagnosis Date    Anxiety     Asthma     Cluster headache 08/2024    Depression     Difficulty walking     Head injury 12/2024    Headache, tension-type 2024    Hypertension     Low back pain     Memory loss     Movement disorder     Vision loss      Family History   Problem Relation Age of Onset    Arthritis Mother     Hypertension Mother     Diabetes Mother     Heart disease Mother     Anxiety disorder Mother     Depression Mother     Early death Mother     Mental illness Mother     Cancer Father     Thyroid disease Father     Depression Father      Social History     Socioeconomic History    Marital status: Single   Tobacco Use    Smoking status: Never     Passive exposure: Yes    Smokeless tobacco: Never   Vaping Use    Vaping status: Never Used   Substance and Sexual Activity    Alcohol use: Not Currently     Comment: 3 times a week    Drug use: Never    Sexual activity: Not Currently     Partners: Male     Birth control/protection: Condom       Review of Systems   Constitutional:  Negative for activity change, fatigue and unexpected weight change.   HENT:  Negative for tinnitus and trouble swallowing.    Eyes:  Negative for photophobia and visual disturbance.   Respiratory:  Negative for apnea, cough and choking.    Cardiovascular:  Negative for leg swelling.   Gastrointestinal:  Negative for nausea and vomiting.  "  Endocrine: Negative for cold intolerance and heat intolerance.   Genitourinary:  Negative for difficulty urinating, frequency, menstrual problem and urgency.   Musculoskeletal:  Positive for back pain. Negative for gait problem, myalgias and neck pain.   Skin:  Negative for color change and rash.   Allergic/Immunologic: Negative for immunocompromised state.   Neurological:  Positive for tremors and headaches. Negative for dizziness, seizures, syncope, facial asymmetry, speech difficulty, weakness, light-headedness and numbness.   Hematological:  Negative for adenopathy. Does not bruise/bleed easily.   Psychiatric/Behavioral:  Positive for decreased concentration, dysphoric mood and sleep disturbance. Negative for behavioral problems, confusion and hallucinations. The patient is nervous/anxious.        Objective     Vitals:    03/05/25 1400   BP: 116/72   Pulse: 100   SpO2: 97%   Weight: (!) 143 kg (316 lb)   Height: 168 cm (66.14\")       Neurological Exam  Mental Status  Awake, alert and oriented to person, place and time. Oriented to person, place and time. Speech is normal. Language is fluent with no aphasia. Attention and concentration are normal. Fund of knowledge is appropriate for level of education.    Cranial Nerves  CN III, IV, VI: Extraocular movements intact bilaterally. Pupils equal round and reactive to light bilaterally.  CN V: Facial sensation is normal.  CN VII: Full and symmetric facial movement.  CN IX, X: Palate elevates symmetrically  CN XI: Shoulder shrug strength is normal.  CN XII: Tongue midline without atrophy or fasciculations.    Motor   Strength is 5/5 throughout all four extremities.    Sensory  Sensation is intact to light touch, pinprick, vibration and proprioception in all four extremities.    Reflexes  Deep tendon reflexes are 2+ and symmetric in all four extremities.    Coordination    Finger-to-nose, rapid alternating movements and heel-to-shin normal bilaterally without " dysmetria.    Gait  Normal casual, toe, heel and tandem gait.       Physical Exam  Eyes:      Extraocular Movements: Extraocular movements intact.      Pupils: Pupils are equal, round, and reactive to light.   Neurological:      Motor: Motor strength is normal.     Coordination: Coordination is intact.      Deep Tendon Reflexes: Reflexes are normal and symmetric.   Psychiatric:         Speech: Speech normal.         Office Visit on 02/05/2025   Component Date Value Ref Range Status    Hemoglobin A1C 02/05/2025 5.8 (A)  4.5 - 5.7 % Final    Lot Number 02/05/2025 [id number]   Final    Expiration Date 02/05/2025 10-4-2026   Final         Assessment & Plan     Problem List Items Addressed This Visit          Neuro    Tremors of nervous system - Primary    Current Assessment & Plan     Mild intermittent tremors like due to Caplyta.                   No follow-ups on file.

## 2025-03-10 ENCOUNTER — E-VISIT (OUTPATIENT)
Dept: ADMINISTRATIVE | Facility: OTHER | Age: 30
End: 2025-03-10
Payer: COMMERCIAL

## 2025-03-10 NOTE — E-VISIT ESCALATED
Status: Referred Out  Date: 03/10/2025 13:05:07  Acuity Level: Not applicable  Referral message: Based on the information you provided during your interview, eVisit is not appropriate for treating your condition.  Patient: Shonetta Davis  Patient : 1995  Patient Address: [address] [place] [address]  Patient Phone: [phone]  Clinician Response: Unavailable  Diagnosis: Unavailable  Diagnosis ICD: Unavailable     Patient Interview Questions and Responses: None available

## 2025-03-12 ENCOUNTER — TELEPHONE (OUTPATIENT)
Dept: OBSTETRICS AND GYNECOLOGY | Facility: CLINIC | Age: 30
End: 2025-03-12
Payer: COMMERCIAL

## 2025-03-12 NOTE — TELEPHONE ENCOUNTER
LVM for pt to call the office to get her 03/21 appt rescheduled due to provider being out of office

## 2025-05-07 ENCOUNTER — OFFICE VISIT (OUTPATIENT)
Dept: FAMILY MEDICINE CLINIC | Facility: CLINIC | Age: 30
End: 2025-05-07
Payer: COMMERCIAL

## 2025-05-07 VITALS
HEIGHT: 66 IN | DIASTOLIC BLOOD PRESSURE: 78 MMHG | WEIGHT: 293 LBS | BODY MASS INDEX: 47.09 KG/M2 | HEART RATE: 96 BPM | SYSTOLIC BLOOD PRESSURE: 122 MMHG | OXYGEN SATURATION: 97 %

## 2025-05-07 DIAGNOSIS — R25.1 TREMOR: ICD-10-CM

## 2025-05-07 DIAGNOSIS — R73.03 PREDIABETES: ICD-10-CM

## 2025-05-07 DIAGNOSIS — G43.809 OTHER MIGRAINE WITHOUT STATUS MIGRAINOSUS, NOT INTRACTABLE: ICD-10-CM

## 2025-05-07 DIAGNOSIS — G89.29 CHRONIC MIDLINE LOW BACK PAIN WITH BILATERAL SCIATICA: Primary | ICD-10-CM

## 2025-05-07 DIAGNOSIS — M54.42 CHRONIC MIDLINE LOW BACK PAIN WITH BILATERAL SCIATICA: Primary | ICD-10-CM

## 2025-05-07 DIAGNOSIS — M54.41 CHRONIC MIDLINE LOW BACK PAIN WITH BILATERAL SCIATICA: Primary | ICD-10-CM

## 2025-05-07 LAB
EXPIRATION DATE: ABNORMAL
HBA1C MFR BLD: 5.8 % (ref 4.5–5.7)
Lab: ABNORMAL

## 2025-05-07 RX ORDER — KETOROLAC TROMETHAMINE 30 MG/ML
30 INJECTION, SOLUTION INTRAMUSCULAR; INTRAVENOUS ONCE
Status: COMPLETED | OUTPATIENT
Start: 2025-05-07 | End: 2025-05-07

## 2025-05-07 RX ORDER — GABAPENTIN 300 MG/1
300 CAPSULE ORAL 3 TIMES DAILY
Qty: 90 CAPSULE | Refills: 2 | Status: SHIPPED | OUTPATIENT
Start: 2025-05-07

## 2025-05-07 RX ADMIN — KETOROLAC TROMETHAMINE 30 MG: 30 INJECTION, SOLUTION INTRAMUSCULAR; INTRAVENOUS at 10:41

## 2025-05-07 NOTE — PROGRESS NOTES
Chief Complaint   Patient presents with    Prediabetes    Headache     Would like to discuss medication for migraines        HPI:  Shonetta Elaine Davis is a 30 y.o. female who presents today for concussion, migraine and low back pain.    ROS:  Constitutional: no fevers, night sweats or unexplained weight loss  Eyes: no vision changes  ENT: no runny nose, ear pain, sore throat  Cardio: no chest pain, palpitations  Pulm: no shortness of breath, wheezing, or cough  GI: no abdominal pain or changes in bowel movements  : no difficulty urinating  MSK: no difficulty ambulating, no joint pain  Neuro: no weakness, dizziness or headache  Psych: no trouble sleeping  Endo: no change in appetite      Past Medical History:   Diagnosis Date    Anxiety     Asthma     Cluster headache 08/2024    Depression     Difficulty walking     Head injury 12/2024    Headache, tension-type 2024    Hypertension     Low back pain     Memory loss     Movement disorder     Vision loss       Family History   Problem Relation Age of Onset    Arthritis Mother     Hypertension Mother     Diabetes Mother     Heart disease Mother     Anxiety disorder Mother     Depression Mother     Early death Mother     Mental illness Mother     Cancer Father     Thyroid disease Father     Depression Father       Social History     Socioeconomic History    Marital status: Single   Tobacco Use    Smoking status: Never     Passive exposure: Yes    Smokeless tobacco: Never   Vaping Use    Vaping status: Never Used   Substance and Sexual Activity    Alcohol use: Not Currently     Comment: 3 times a week    Drug use: Never    Sexual activity: Not Currently     Partners: Male     Birth control/protection: Condom      No Known Allergies   Immunization History   Administered Date(s) Administered    DTaP, Unspecified 02/18/1998    HPV Quadrivalent 04/10/2007, 10/09/2007    Hepatitis A 11/07/2018    MCV4 Unspecified 04/10/2007    MMR 02/20/1997    OPV 02/18/1998    Td (TDVAX)  07/07/2005        PE:  Vitals:    05/07/25 0952   BP: 122/78   Pulse: 96   SpO2: 97%      Body mass index is 50.27 kg/m².    Gen Appearance: NAD  HEENT: Normocephalic, PERRLA, no thyromegaly, trache midline  Heart: RRR, normal S1 and S2, no murmur  Lungs: CTA b/l, no wheezing, no crackles  Abdomen: Soft, non-tender, non-distended, no guarding and BSx4  MSK: Moves all extremities well, normal gait, no peripheral edema  Pulses: Palpable and equal b/l  Lymph nodes: No palpable lymphadenopathy   Neuro: No focal deficits      Current Outpatient Medications   Medication Sig Dispense Refill    Caplyta 42 MG capsule Take 1 capsule by mouth Daily.      cyclobenzaprine (FLEXERIL) 10 MG tablet Take 1 tablet by mouth 3 (Three) Times a Day As Needed for Muscle Spasms. 270 tablet 0    FLUoxetine (PROzac) 10 MG capsule Take 1 capsule by mouth Every Morning. 30 capsule 5    hydrOXYzine pamoate (VISTARIL) 50 MG capsule Take 1 capsule by mouth 3 (Three) Times a Day As Needed.      ibuprofen (ADVIL,MOTRIN) 600 MG tablet Take 1 tablet by mouth Daily As Needed for Moderate Pain. 30 tablet 0    prazosin (MINIPRESS) 1 MG capsule Take 1 capsule by mouth Every Night.      traZODone (DESYREL) 50 MG tablet Take 1 tablet by mouth Every Night.      metFORMIN (GLUCOPHAGE) 850 MG tablet Take 1 tablet by mouth Daily With Breakfast. (Patient not taking: Reported on 5/7/2025) 30 tablet 0     No current facility-administered medications for this visit.      Trial of nurtec for migraines, recommend establish care with neurology for second opinion per patient request.    Recommend updated MRI, bilateral sciatica symptoms.  Tried physical therapy last year without success.  Refer to pain management.    2022 lumbar MRI:  1. Degenerative disc disease at L5-S1 with small central disc extrusion  with slight inferior migration which appears overall stable from the  prior exam from 07/17/2022. There is minimal STIR hyperintense signal  along the posterior  aspect of the L5 endplate which is most likely  degenerative and less likely related to early discitis/osteomyelitis.  There is no abnormal enhancement or epidural collection.    Counseling was given to patient for the following topics: diagnostic results, instructions for management, impressions, and risks and benefits of treatment options . Total time of the encounter was 40 minutes and 20 minutes was spent face to face counseling.    Diagnoses and all orders for this visit:    1. Chronic midline low back pain with bilateral sciatica (Primary)  -     Ambulatory Referral to Pain Management  -     MRI Lumbar Spine Without Contrast; Future    2. Prediabetes  -     POC Glycosylated Hemoglobin (Hb A1C)    3. Other migraine without status migrainosus, not intractable    4. Tremor         No follow-ups on file.     Dictated Utilizing Dragon Dictation    Please note that portions of this note were completed with a voice recognition program.    Part of this note may be an electronic transcription/translation of spoken language to printed text using the Dragon Dictation System.

## 2025-05-23 PROBLEM — Z01.419 WELL WOMAN EXAM: Status: ACTIVE | Noted: 2025-05-23

## 2025-05-25 ENCOUNTER — HOSPITAL ENCOUNTER (OUTPATIENT)
Facility: HOSPITAL | Age: 30
Discharge: HOME | End: 2025-05-25
Admitting: INTERNAL MEDICINE
Payer: COMMERCIAL

## 2025-05-25 DIAGNOSIS — G89.29 CHRONIC MIDLINE LOW BACK PAIN WITH BILATERAL SCIATICA: ICD-10-CM

## 2025-05-25 DIAGNOSIS — M54.41 CHRONIC MIDLINE LOW BACK PAIN WITH BILATERAL SCIATICA: ICD-10-CM

## 2025-05-25 DIAGNOSIS — M54.42 CHRONIC MIDLINE LOW BACK PAIN WITH BILATERAL SCIATICA: ICD-10-CM

## 2025-05-25 PROCEDURE — 72148 MRI LUMBAR SPINE W/O DYE: CPT

## 2025-06-04 ENCOUNTER — OFFICE VISIT (OUTPATIENT)
Dept: PAIN MEDICINE | Facility: CLINIC | Age: 30
End: 2025-06-04
Payer: COMMERCIAL

## 2025-06-04 VITALS — RESPIRATION RATE: 16 BRPM | BODY MASS INDEX: 47.09 KG/M2 | HEIGHT: 66 IN | WEIGHT: 293 LBS

## 2025-06-04 DIAGNOSIS — G89.4 CHRONIC PAIN SYNDROME: ICD-10-CM

## 2025-06-04 DIAGNOSIS — M54.51 VERTEBROGENIC LOW BACK PAIN: ICD-10-CM

## 2025-06-04 DIAGNOSIS — M79.7 FIBROMYALGIA: Primary | ICD-10-CM

## 2025-06-04 PROCEDURE — 99204 OFFICE O/P NEW MOD 45 MIN: CPT | Performed by: STUDENT IN AN ORGANIZED HEALTH CARE EDUCATION/TRAINING PROGRAM

## 2025-06-04 PROCEDURE — 1125F AMNT PAIN NOTED PAIN PRSNT: CPT | Performed by: STUDENT IN AN ORGANIZED HEALTH CARE EDUCATION/TRAINING PROGRAM

## 2025-06-04 PROCEDURE — 1160F RVW MEDS BY RX/DR IN RCRD: CPT | Performed by: STUDENT IN AN ORGANIZED HEALTH CARE EDUCATION/TRAINING PROGRAM

## 2025-06-04 PROCEDURE — 1159F MED LIST DOCD IN RCRD: CPT | Performed by: STUDENT IN AN ORGANIZED HEALTH CARE EDUCATION/TRAINING PROGRAM

## 2025-06-04 RX ORDER — LORATADINE 10 MG/1
1 TABLET ORAL DAILY
COMMUNITY
Start: 2025-05-27

## 2025-06-04 RX ORDER — FLUTICASONE PROPIONATE 50 MCG
SPRAY, SUSPENSION (ML) NASAL
COMMUNITY
Start: 2025-05-27

## 2025-06-04 RX ORDER — DULOXETIN HYDROCHLORIDE 30 MG/1
30 CAPSULE, DELAYED RELEASE ORAL DAILY
Qty: 60 CAPSULE | Refills: 0 | Status: SHIPPED | OUTPATIENT
Start: 2025-06-04

## 2025-06-04 RX ORDER — PREGABALIN 50 MG/1
50 CAPSULE ORAL 2 TIMES DAILY
Qty: 60 CAPSULE | Refills: 1 | Status: SHIPPED | OUTPATIENT
Start: 2025-06-04

## 2025-06-04 NOTE — PROGRESS NOTES
Referring Physician: Jose Jean DO  [address] [place] [address]    Primary Physician: Jose Jean DO    CHIEF COMPLAINT or REASON FOR VISIT: Chronic midline low back pain with bilateral sciatica      Initial history of present illness on 06/04/2025:  Ms. Shonetta Davis is 30 y.o. female who presents as a new patient referral for evaluation and treatment of multifocal total body pain.  Patient describes pain over the entire surface of her body including head face neck arms chest abdomen back legs and feet.  This has been present for many years without any specific inciting event or trauma.  She describes a history of irritable bowel syndrome.  She describes a history of fatigue.  She has recently started gabapentin which is somewhat helpful.  She has done physical therapy in the past however this seemed to exacerbate her pain.  Denies any history of spinal surgery or intervention.  She is obese with a BMI of 50.70.    Interval history:    Interventions:      Objective Pain Scoring:   BRIEF PAIN INVENTORY:  Total score:   Pain Score    06/04/25 1049   PainSc: 8    PainLoc: Back      PHQ-2: 5  PHQ-9: 21  Opioid Risk Tool:         Review of Systems:   ROS negative except as otherwise noted     Past Medical History:   Past Medical History:   Diagnosis Date    Anxiety     Asthma     Chronic pain disorder 2022    Cluster headache 08/2024    Depression     Difficulty walking     Extremity pain     Head injury 12/2024    Headache, tension-type 2024    Hypertension     Low back pain     Memory loss     Movement disorder     Vision loss          Past Surgical History:   Past Surgical History:   Procedure Laterality Date    TONSILLECTOMY AND ADENOIDECTOMY           Family History   Family History   Problem Relation Age of Onset    Arthritis Mother     Hypertension Mother     Diabetes Mother     Heart disease Mother     Anxiety disorder Mother     Depression Mother     Early death Mother   "   Mental illness Mother     Cancer Father     Thyroid disease Father     Depression Father          Social History   Social History     Socioeconomic History    Marital status: Single   Tobacco Use    Smoking status: Never     Passive exposure: Yes    Smokeless tobacco: Never   Vaping Use    Vaping status: Never Used   Substance and Sexual Activity    Alcohol use: Not Currently     Comment: 3 times a week    Drug use: Never    Sexual activity: Not Currently     Partners: Male     Birth control/protection: Condom        Medications:     Current Outpatient Medications:     Caplyta 42 MG capsule, Take 1 capsule by mouth Daily., Disp: , Rfl:     cyclobenzaprine (FLEXERIL) 10 MG tablet, Take 1 tablet by mouth 3 (Three) Times a Day As Needed for Muscle Spasms., Disp: 270 tablet, Rfl: 0    FLUoxetine (PROzac) 10 MG capsule, Take 1 capsule by mouth Every Morning., Disp: 30 capsule, Rfl: 5    fluticasone (FLONASE) 50 MCG/ACT nasal spray, USE 2 SPRAY(S) IN EACH NOSTRIL ONCE DAILY. BLOW NOSE PRIOR TO USE., Disp: , Rfl:     gabapentin (NEURONTIN) 300 MG capsule, Take 1 capsule by mouth 3 (Three) Times a Day., Disp: 90 capsule, Rfl: 2    hydrOXYzine pamoate (VISTARIL) 50 MG capsule, Take 1 capsule by mouth 3 (Three) Times a Day As Needed., Disp: , Rfl:     ibuprofen (ADVIL,MOTRIN) 600 MG tablet, Take 1 tablet by mouth Daily As Needed for Moderate Pain., Disp: 30 tablet, Rfl: 0    loratadine (CLARITIN) 10 MG tablet, Take 1 tablet by mouth Daily., Disp: , Rfl:     prazosin (MINIPRESS) 1 MG capsule, Take 1 capsule by mouth Every Night., Disp: , Rfl:     traZODone (DESYREL) 50 MG tablet, Take 1 tablet by mouth Every Night., Disp: , Rfl:     metFORMIN (GLUCOPHAGE) 850 MG tablet, Take 1 tablet by mouth Daily With Breakfast. (Patient not taking: Reported on 3/5/2025), Disp: 30 tablet, Rfl: 0        Physical Exam:     Vitals:    06/04/25 1049   Resp: 16   Weight: (!) 143 kg (315 lb 8 oz)   Height: 168 cm (66.14\")   PainSc: 8  "   PainLoc: Back        General: Alert and oriented, No acute distress.   HEENT: Normocephalic, atraumatic.   Cardiovascular: No gross edema  Respiratory: Respirations are non-labored      Lumbar Spine:   No masses or atrophy  Range of motion - Flexion reduced. Extension reduced.    Facet Loading: Negative bilaterally  Facet Palpation -diffusely tender  Mary Anne finger/Gaenslen's/Darren's/DEVEN/Thigh thrust -   Straight leg raise/slump test: Negative bilaterally  Multifidus toe-touch test:    Motor Exam:       Strength: Rate on 1-5 scale Right Left    L1/2- hip flexion 5/5  5/5    L3- knee extension 5/5  5/5    L4- ankle dorsiflexion 5/5  5/5    L5- great toe extension 5/5  5/5    S1- ankle plantarflexion 5/5  5/5    Sensory Exam: Full and equal sensation to light touch throughout.       Neurologic: Cranial Nerves II-XII are grossly intact.      Psychiatric: Cooperative.   Gait: Normal   Assistive Devices: None    Imaging Studies:   Results for orders placed during the hospital encounter of 05/25/25    MRI Lumbar Spine Without Contrast    Narrative  MRI LUMBAR SPINE WO CONTRAST    Date of Exam: 5/25/2025 4:46 PM EDT    Indication: low back pain.    Comparison: 7/29/2022    Technique:  Routine multiplanar/multisequence sequence images of the lumbar spine were obtained without contrast administration.    Findings: Lumbar vertebral body height and alignment are normal. Degenerative disc phenomenon L5-S1. No retroperitoneal mass or adenopathy. The conus medullaris terminates at the L1 vertebral body level. No cord signal abnormalities. Degenerative  endplate edema at L5-S1.    L1-L2: No significant spinal canal or foraminal narrowing.    L2-L3: No significant spinal canal or foraminal narrowing.    L3-L4: No significant spinal canal or foraminal narrowing.    L4-L5: No significant spinal canal or foraminal narrowing.    L5-S1: Disc osteophyte complex. No canal stenosis. No foraminal  narrowing.    Impression  Degenerative endplate edema at L5-S1.      Electronically Signed: Nolan Amos MD  5/25/2025 7:38 PM EDT  Workstation ID: [id number]      Results for orders placed during the hospital encounter of 07/17/22    MRI Lumbar Spine Without Contrast    Narrative  EXAMINATION: MRI THORACIC SPINE WO CONTRAST, MRI LUMBAR SPINE WO CONTRAST, MRI CERVICAL SPINE WO CONTRAST    DATE: 7/17/2022 10:51 PM    INDICATION: Neck back and low back pain radiating to bilat lower extremities w increased severity to rt anterior thigh.    COMPARISON: None available.    TECHNIQUE: Sagittal and axial noncontrast images of the cervical spine were obtained in the usual manner.    FINDINGS:    Cord and epidural space:    No cerebellar tonsillar ectopia. Cervical cord is normal in size and signal.    Vertebral column:    Straightening of usual lordosis. Vertebral body heights are maintained. No concerning marrow signal.    C2-C3: No significant central canal or foraminal narrowing.    C3-C4: No significant central canal or foraminal narrowing.    C4-C5: No significant central canal or foraminal narrowing.    C5-C6: No significant central canal or foraminal narrowing.    C6-C7: No significant central canal or foraminal narrowing.    C7-T1: No significant central canal or foraminal narrowing.    Soft tissues:    Cervical soft tissues are unremarkable.      TECHNIQUE: Sagittal and axial noncontrast images of the thoracic spine were obtained in the usual manner.    FINDINGS:    Cord and epidural space:    Thoracic cord is normal in size and signal.    Vertebral column:    Spinal alignment is anatomic. Vertebral body heights are maintained. No concerning marrow signal.    T1-T2: There is preserved disc height. No focal disc protrusion or significant stenosis.    T2-T3: There is preserved disc height. No focal disc protrusion or significant stenosis.    T3-T4: There is preserved disc height. No focal disc protrusion or significant  stenosis.    T4-T5: There is preserved disc height. No focal disc protrusion or significant stenosis.    T5-T6: There is preserved disc height. No focal disc protrusion or significant stenosis.    T6-T7: There is preserved disc height. No focal disc protrusion or significant stenosis.    T7-T8: There is preserved disc height. No focal disc protrusion or significant stenosis.    T8-T9: There is preserved disc height. No focal disc protrusion or significant stenosis.    T9-T10: There is preserved disc height. No focal disc protrusion or significant stenosis.    T10-T11: There is preserved disc height. No focal disc protrusion or significant stenosis.    T11-T12: There is preserved disc height. No focal disc protrusion or significant stenosis.    Soft tissues:    Imaged portions of the chest and upper abdomen are unremarkable.      TECHNIQUE: Sagittal and axial noncontrast images of the lumbar spine were obtained in the usual manner.    FINDINGS:    Cord and epidural space:    Conus terminates at L2. Nerve roots of the cauda equina are normal.    Vertebral column:    Spinal alignment is anatomic. There are five lumbar type vertebral bodies. Vertebral body heights are maintained    T1 hypointensity and STIR hyperintensity within the endplates about the L5-S1 disc space, most conspicuously involving the L5 inferior endplate; this most likely represents degenerative marrow changes.    Imaged portions of the sacrum and sacroiliac joints are within normal limits.    T12-L1:  No significant central canal or foraminal narrowing.    L1-L2:  No significant central canal or foraminal narrowing.    L2-L3:  No significant central canal or foraminal narrowing.    L3-L4:  No significant central canal or foraminal narrowing.    L4-L5: No significant central canal or foraminal narrowing.    L5-S1: Mild disc desiccation. Tiny central disc extrusion with inferior migration. The extruded disc extends 4 mm inferiorly beyond the superior  endplate of S1. No significant spinal canal or foraminal narrowing. No significant central canal or foraminal  narrowing.    Soft tissues:    Imaged abdominal and retroperitoneal soft tissues are unremarkable.    Impression  MRI of the cervical spine:    1.  Normal MRI of the cervical spine.  No focal disc protrusion or significant stenosis.    2.  No cord compression or cord signal abnormality.        MRI of the thoracic spine:    1.  Normal MRI of the thoracic spine.  No focal disc protrusion or significant stenosis.    2.  No cord compression or cord signal abnormality.        MRI of the lumbar spine:    1.  Mild degenerative disc disease at L5-S1 mild disc desiccation and a tiny central extrusion without significant spinal canal or foraminal narrowing.  2.  Mild marrow signal abnormality about the L5-S1 disc space is most likely degenerative, however, very early discitis osteomyelitis could have a similar appearance. If there is further clinical concern for infection, suggest follow-up MRI of the lumbar  spine with and without contrast in 6-8 weeks.  3.  The remainder of the lumbar spine essentially normal.  4.  No compression of the cauda equina.            Electronically signed by:  Brian Barrera DO  7/17/2022 10:12 PM Mountain Time        Independent review of radiographic imaging performed 6/4/2025:     Available for my interpretation is a lumbar MRI dated May 25, 2025 demonstrating L5/S1 degenerative disc disease with inferior endplate L5 T2 and STIR sequence hyperintensity favoring Modic 1 endplate changes.  There is no high-grade neuroforaminal or canal stenosis.    Impression & Plan:       06/04/2025: Shonetta Davis is a 30 y.o. female with past medical history significant for bipolar 1, prediabetes, obesity, depression, who presents to the pain clinic for evaluation and treatment of chronic multifocal total body pain.  Evaluation is most consistent with fibromyalgia.  We discussed the diagnosis of  fibromyalgia as well as the evidence-based treatments including membrane stabilizers (gabapentin, pregabalin), SNRI (duloxetine, venlafaxine), physical activity (aqua therapy, beginners yoga) and cognitive behavioral therapy.  Imaging does demonstrate L5-S1 degenerative disc disease with Modic 1 endplate changes.  She may be a candidate for basivertebral nerve ablation of L5 once her BMI is below 40.  I do not think this procedure would help with her other multifocal pain.    1. Fibromyalgia    2. Chronic pain syndrome    3. Vertebrogenic low back pain        PLAN:  1. Medications:    - Start duloxetine 30 mg daily  -Start pregabalin 50 mg twice daily 60 pills 1 refill  -As part of this patient's treatment plan, patient may be prescribed controlled substances. The patient has been made aware of appropriate use of such medications, including potential risk of somnolence, limited ability to drive and /or work safely, and potential for dependence or overdose. It has also been made clear that these medications are for use by this patient only, without concomitant use of alcohol or other substances unless prescribed. Controlled substance status of medication discussed with patient, discussed risks of medication including abuse potential and diversion potential and need to follow up for reevaluation appointment in order to receive further refills.  Yazan was reviewed and compliant.    2. Physical Therapy: Recommend aqua therapy    3. Psychological: Referral for cognitive behavioral therapy    4. Complementary and alternative (CAM) Therapies:     5. Labs/Diagnostic studies: None indicated     6. Imaging: MRI independently interpreted and reviewed with patient    7. Interventions: None indicated     8. Referrals: None indicated     9. Records: n/a    10. Lifestyle goals: Recommend weight loss    Follow-up 2 months      [facility]  Don Killian MD          Quality  Metrics:                Please note that portions of this note were completed with a voice recognition program.      Any copied data in any portion of my note from previous notes included in the HPI, PE, MDM and/or assessment and plan has been reviewed by myself and accurate as of this date.      The 21st Century Cures Act makes medical notes like this available to patients in the interest of transparency. This is a medical document intended as peer to peer communication. It is written in medical language and may contain abbreviations or verbiage that are unfamiliar. It may appear blunt or direct. Medical documents are intended to carry relevant information, facts as evident, and the clinical opinion of the practitioner.

## 2025-06-26 ENCOUNTER — LAB (OUTPATIENT)
Dept: LAB | Facility: HOSPITAL | Age: 30
End: 2025-06-26
Payer: COMMERCIAL

## 2025-06-26 ENCOUNTER — HOSPITAL ENCOUNTER (OUTPATIENT)
Dept: GENERAL RADIOLOGY | Facility: HOSPITAL | Age: 30
Discharge: HOME | End: 2025-06-26
Payer: COMMERCIAL

## 2025-06-26 ENCOUNTER — OFFICE VISIT (OUTPATIENT)
Dept: NEUROSURGERY | Facility: CLINIC | Age: 30
End: 2025-06-26
Payer: COMMERCIAL

## 2025-06-26 VITALS
WEIGHT: 293 LBS | HEIGHT: 66 IN | BODY MASS INDEX: 47.09 KG/M2 | SYSTOLIC BLOOD PRESSURE: 138 MMHG | DIASTOLIC BLOOD PRESSURE: 92 MMHG

## 2025-06-26 DIAGNOSIS — E61.1 IRON DEFICIENCY: ICD-10-CM

## 2025-06-26 DIAGNOSIS — M79.7 FIBROMYALGIA: ICD-10-CM

## 2025-06-26 DIAGNOSIS — M51.360 DEGENERATION OF INTERVERTEBRAL DISC OF LUMBAR REGION WITH DISCOGENIC BACK PAIN: ICD-10-CM

## 2025-06-26 DIAGNOSIS — M51.360 DEGENERATION OF INTERVERTEBRAL DISC OF LUMBAR REGION WITH DISCOGENIC BACK PAIN: Primary | ICD-10-CM

## 2025-06-26 LAB
ALBUMIN SERPL-MCNC: 3.8 G/DL (ref 3.5–5.2)
ALBUMIN/GLOB SERPL: 1.2 G/DL
ALP SERPL-CCNC: 73 U/L (ref 39–117)
ALT SERPL W P-5'-P-CCNC: 15 U/L (ref 1–33)
ANION GAP SERPL CALCULATED.3IONS-SCNC: 9.9 MMOL/L (ref 5–15)
AST SERPL-CCNC: 19 U/L (ref 1–32)
BILIRUB SERPL-MCNC: <0.2 MG/DL (ref 0–1.2)
BUN SERPL-MCNC: 9 MG/DL (ref 6–20)
BUN/CREAT SERPL: 11.5 (ref 7–25)
CALCIUM SPEC-SCNC: 8.8 MG/DL (ref 8.6–10.5)
CHLORIDE SERPL-SCNC: 104 MMOL/L (ref 98–107)
CO2 SERPL-SCNC: 24.1 MMOL/L (ref 22–29)
CREAT SERPL-MCNC: 0.78 MG/DL (ref 0.57–1)
EGFRCR SERPLBLD CKD-EPI 2021: 104.3 ML/MIN/1.73
ERYTHROCYTE [SEDIMENTATION RATE] IN BLOOD: 31 MM/HR (ref 0–20)
GLOBULIN UR ELPH-MCNC: 3.3 GM/DL
GLUCOSE SERPL-MCNC: 98 MG/DL (ref 65–99)
IRON 24H UR-MRATE: 35 MCG/DL (ref 37–145)
IRON SATN MFR SERPL: 9 % (ref 20–50)
POTASSIUM SERPL-SCNC: 3.9 MMOL/L (ref 3.5–5.2)
PROLACTIN SERPL-MCNC: 11.7 NG/ML (ref 4.79–23.3)
PROT SERPL-MCNC: 7.1 G/DL (ref 6–8.5)
SODIUM SERPL-SCNC: 138 MMOL/L (ref 136–145)
TIBC SERPL-MCNC: 398 MCG/DL (ref 298–536)
TRANSFERRIN SERPL-MCNC: 267 MG/DL (ref 200–360)

## 2025-06-26 PROCEDURE — 72120 X-RAY BEND ONLY L-S SPINE: CPT

## 2025-06-26 PROCEDURE — 36415 COLL VENOUS BLD VENIPUNCTURE: CPT

## 2025-06-26 PROCEDURE — 85652 RBC SED RATE AUTOMATED: CPT

## 2025-06-26 PROCEDURE — 80053 COMPREHEN METABOLIC PANEL: CPT

## 2025-06-26 PROCEDURE — 99214 OFFICE O/P EST MOD 30 MIN: CPT | Performed by: PHYSICIAN ASSISTANT

## 2025-06-26 PROCEDURE — 84466 ASSAY OF TRANSFERRIN: CPT

## 2025-06-26 PROCEDURE — 84146 ASSAY OF PROLACTIN: CPT

## 2025-06-26 PROCEDURE — 83540 ASSAY OF IRON: CPT

## 2025-06-26 NOTE — PROGRESS NOTES
Shonetta Elaine Davis   1995   [id number]       06/26/2025     CC: Neck pain back pain headaches       HPI   Ms. Lundberg is a 30-year-old female who presents for neurosurgical evaluation regarding headaches that she described started after she took a fall down the steps in January 2025.  She was told that she had a brain contusion.  She feels that her memory is affected by her contusion.  She has had chronic neck pain and back pain for several years and this continues.  She does have occasional numbness and tingling into the hands and into the feet, it is very random comes and goes does not last for very long.  When it does happen she feels that she is a bit clumsy with her hands.  She has had an MRI of her brain.  She presents with a MRI of the lumbar spine to review.     Chronic Illnesses:  Past Medical History:  No date: Anxiety  No date: Asthma  2022: Chronic pain disorder  08/2024: Cluster headache  No date: Depression  No date: Difficulty walking  No date: Extremity pain  12/2024: Head injury  2024: Headache, tension-type  No date: Hypertension  No date: Low back pain  No date: Memory loss  No date: Movement disorder  No date: Vision loss     Past Surgical History:   Procedure Laterality Date    TONSILLECTOMY AND ADENOIDECTOMY          No Known Allergies       Current Outpatient Medications:     Caplyta 42 MG capsule, Take 1 capsule by mouth Daily., Disp: , Rfl:     cyclobenzaprine (FLEXERIL) 10 MG tablet, Take 1 tablet by mouth 3 (Three) Times a Day As Needed for Muscle Spasms., Disp: 270 tablet, Rfl: 0    DULoxetine (CYMBALTA) 30 MG capsule, Take 1 capsule by mouth Daily., Disp: 60 capsule, Rfl: 0    fluticasone (FLONASE) 50 MCG/ACT nasal spray, USE 2 SPRAY(S) IN EACH NOSTRIL ONCE DAILY. BLOW NOSE PRIOR TO USE., Disp: , Rfl:     hydrOXYzine pamoate (VISTARIL) 50 MG capsule, Take 1 capsule by mouth 3 (Three) Times a Day As Needed., Disp: , Rfl:     ibuprofen (ADVIL,MOTRIN) 600 MG tablet, Take 1 tablet by  mouth Daily As Needed for Moderate Pain., Disp: 30 tablet, Rfl: 0    loratadine (CLARITIN) 10 MG tablet, Take 1 tablet by mouth Daily., Disp: , Rfl:     prazosin (MINIPRESS) 1 MG capsule, Take 1 capsule by mouth Every Night., Disp: , Rfl:     pregabalin (LYRICA) 50 MG capsule, Take 1 capsule by mouth 2 (Two) Times a Day., Disp: 60 capsule, Rfl: 1    traZODone (DESYREL) 50 MG tablet, Take 1 tablet by mouth Every Night., Disp: , Rfl:      Social History     Socioeconomic History    Marital status: Single   Tobacco Use    Smoking status: Never     Passive exposure: Past    Smokeless tobacco: Never   Vaping Use    Vaping status: Never Used   Substance and Sexual Activity    Alcohol use: Not Currently     Comment: 3 times a week    Drug use: Never    Sexual activity: Not Currently     Partners: Male     Birth control/protection: Condom        family history includes Anxiety disorder in her mother; Arthritis in her mother; Cancer in her father; Depression in her father and mother; Diabetes in her mother; Early death in her mother; Heart disease in her mother; Hypertension in her mother; Mental illness in her mother; Thyroid disease in her father.     Review of Systems       Gait & Balance Assessment :  Risk assessment for falls. Fall precautions.  universal fall precautions, such as;   Using gait aids a cane, walker at the appropriate height at all times for ambulation or if necessary a wheelchair  Removing all area rugs and coffee tables to create a safe environment at home  Ensure clean, dry floors  Wearing supportive footwear and properly fitting clothing  Ensure bed/chair is appropriate height and patient's feet can touch the floor  Using a shower transfer bench  Using walk-in shower and having shower safety bars installed  Ensure proper lighting, minimize glare  Have nightlights operational and in use  Participation in an exercise program for gait training, balance training and strength  Avoid carrying laundry up and  "down steps  Ensure proper compliance and organization of medications to avoid errors   Avoid use of over the counter sedatives and alcohol consumption  Ensure easy access to call bell, glasses, TV control, telephone  Ensure glasses/hearing aids are in use or close by (on top of night table)     Shonetta Elaine Davis  reports that she has never smoked. She has been exposed to tobacco smoke. She has never used smokeless tobacco.      Body mass index is 50.42 kg/m².       Physical Examination:  /92 (BP Location: Right arm, Patient Position: Sitting, Cuff Size: Large Adult)   Ht 167.6 cm (66\")   Wt (!) 142 kg (312 lb 6.4 oz)   BMI 50.42 kg/m²    HEENT-wnl  Lungs-No wheezing or SOB  Patient is awake, alert and oriented.  Pupils 3 mm, equal and reactive.  Visual fields are full.  Face symmetric.  5/5 in the extremities.  Sensation intact.  Reflexes intact.  Gait normal    Radiological Data Review:  All neurological imaging studies were independently reviewed unless otherwise documented.          *Images from MRI dated 7/21/2022 show degenerative changes however the STIR hyperintense signal into the endplate and lower aspect of the L5 vertebral body has increased.    Assessment and Plan:  Diagnoses and all orders for this visit:    1. Degeneration of intervertebral disc of lumbar region with discogenic back pain (Primary)  -     Prolactin; Future  -     Comprehensive Metabolic Panel; Future  -     Sedimentation Rate; Future  -     XR Spine Lumbar Flex & Ext; Future    2. Fibromyalgia  -     Prolactin; Future  -     Comprehensive Metabolic Panel; Future  -     Sedimentation Rate; Future  -     XR Spine Lumbar Flex & Ext; Future    Other orders  -     XR outside films; Future  -     CT outside films; Future    Ms. Lundberg is a 30-year-old female with headaches, difficulties with memory recall, neck pain, thoracic pain, low back pain, numbness in the right foot.  She gets random numbness and tingling and clumsiness in " her hands and numbness and tingling in the feet.  She has seen neurology, Dr. Romero, for tremors and headaches, he felt that her symptoms were most likely due to her medications.  I did review the MRI of her brain showing a few punctate T2 hyperintensities. With her back pain I have reviewed the new MRI scan which shows increased Modic changes in the L5 vertebral body.  Do not think this is infection but I am going to order markers, I have ordered flexion-extension x-rays of the lumbar spine and I will see her back in the office to review.  Otherwise the changes in the L5 vertebral body are most likely Modic changes related to her degenerative disc at L5-S1 and her weight.  At this point with her BMI 50.42 she is not an elective surgical candidate, she needs to be evaluated and treated for weight management.    Including assessment, review of prior documentation, review and interpretation of new and old diagnostic studies, discussing these findings with the patient and documentation, 30 minutes total time was spent on this appointment.    Any copied data from previous notes included in the (1) HPI, (2) PE, (3) MDM and/or assessment and plan has been reviewed and is accurate as of this date.    Sindi Medina, PAC    PCP:  Jose Jean DO

## 2025-06-27 DIAGNOSIS — M51.360 DEGENERATION OF INTERVERTEBRAL DISC OF LUMBAR REGION WITH DISCOGENIC BACK PAIN: ICD-10-CM

## 2025-06-27 RX ORDER — CYCLOBENZAPRINE HCL 10 MG
10 TABLET ORAL 3 TIMES DAILY PRN
Qty: 270 TABLET | Refills: 0 | Status: SHIPPED | OUTPATIENT
Start: 2025-06-27

## 2025-07-01 DIAGNOSIS — M79.7 FIBROMYALGIA: ICD-10-CM

## 2025-07-01 DIAGNOSIS — M51.360 DEGENERATION OF INTERVERTEBRAL DISC OF LUMBAR REGION WITH DISCOGENIC BACK PAIN: Primary | ICD-10-CM

## 2025-07-01 DIAGNOSIS — G89.29 NECK PAIN, CHRONIC: Primary | ICD-10-CM

## 2025-07-01 DIAGNOSIS — M54.2 NECK PAIN, CHRONIC: Primary | ICD-10-CM

## 2025-07-08 ENCOUNTER — OFFICE VISIT (OUTPATIENT)
Dept: NEUROSURGERY | Facility: CLINIC | Age: 30
End: 2025-07-08
Payer: COMMERCIAL

## 2025-07-08 ENCOUNTER — HOSPITAL ENCOUNTER (OUTPATIENT)
Dept: GENERAL RADIOLOGY | Facility: HOSPITAL | Age: 30
Discharge: HOME | End: 2025-07-08
Admitting: PHYSICIAN ASSISTANT
Payer: COMMERCIAL

## 2025-07-08 VITALS — HEIGHT: 66 IN | BODY MASS INDEX: 47.09 KG/M2 | WEIGHT: 293 LBS | TEMPERATURE: 97.5 F

## 2025-07-08 DIAGNOSIS — G89.29 CHRONIC BILATERAL LOW BACK PAIN WITHOUT SCIATICA: ICD-10-CM

## 2025-07-08 DIAGNOSIS — G89.29 NECK PAIN, CHRONIC: ICD-10-CM

## 2025-07-08 DIAGNOSIS — M51.360 DEGENERATION OF INTERVERTEBRAL DISC OF LUMBAR REGION WITH DISCOGENIC BACK PAIN: Primary | ICD-10-CM

## 2025-07-08 DIAGNOSIS — M54.2 NECK PAIN, CHRONIC: ICD-10-CM

## 2025-07-08 DIAGNOSIS — M54.50 CHRONIC BILATERAL LOW BACK PAIN WITHOUT SCIATICA: ICD-10-CM

## 2025-07-08 PROCEDURE — 72050 X-RAY EXAM NECK SPINE 4/5VWS: CPT

## 2025-07-08 NOTE — PROGRESS NOTES
Concepciónanushadwaine Bernadine Lundberg   1995   [id number]       7/8/2025     CC: Neck pain, back pain. headaches       HPI   Ms. Lundberg is a 30-year old female who I saw on 6/26/25 for headaches that she described started after she took a fall down the steps in January 2025.  She was told that she had a brain contusion.  She feels that her memory is affected by her contusion.  She has chronic neck pain and back pain for several years and this continues.  She does have occasional numbness and tingling into the hands and into the feet, it is very random comes and goes does not last for very long.  When it does happen she feels that she is a bit clumsy with her hands.   We are reviewing xrays today. She is taking her medications with some improvement. Potential consideration of Fibromyalgia.    Chronic Illnesses:  Past Medical History:  No date: Anxiety  No date: Asthma  2022: Chronic pain disorder  08/2024: Cluster headache  No date: Depression  No date: Difficulty walking  No date: Extremity pain  12/2024: Head injury  2024: Headache, tension-type  No date: Hypertension  No date: Low back pain  No date: Memory loss  No date: Movement disorder  No date: Vision loss     Past Surgical History:   Procedure Laterality Date    TONSILLECTOMY AND ADENOIDECTOMY          No Known Allergies       Current Outpatient Medications:     Caplyta 42 MG capsule, Take 1 capsule by mouth Daily., Disp: , Rfl:     cyclobenzaprine (FLEXERIL) 10 MG tablet, Take 1 tablet by mouth 3 (Three) Times a Day As Needed for Muscle Spasms., Disp: 270 tablet, Rfl: 0    DULoxetine (CYMBALTA) 30 MG capsule, Take 1 capsule by mouth Daily., Disp: 60 capsule, Rfl: 0    fluticasone (FLONASE) 50 MCG/ACT nasal spray, USE 2 SPRAY(S) IN EACH NOSTRIL ONCE DAILY. BLOW NOSE PRIOR TO USE., Disp: , Rfl:     hydrOXYzine pamoate (VISTARIL) 50 MG capsule, Take 1 capsule by mouth 3 (Three) Times a Day As Needed., Disp: , Rfl:     ibuprofen (ADVIL,MOTRIN) 600 MG tablet, Take 1 tablet  by mouth Daily As Needed for Moderate Pain., Disp: 30 tablet, Rfl: 0    loratadine (CLARITIN) 10 MG tablet, Take 1 tablet by mouth Daily., Disp: , Rfl:     prazosin (MINIPRESS) 1 MG capsule, Take 1 capsule by mouth Every Night., Disp: , Rfl:     pregabalin (LYRICA) 50 MG capsule, Take 1 capsule by mouth 2 (Two) Times a Day., Disp: 60 capsule, Rfl: 1    traZODone (DESYREL) 50 MG tablet, Take 1 tablet by mouth Every Night., Disp: , Rfl:      Social History     Socioeconomic History    Marital status: Single   Tobacco Use    Smoking status: Never     Passive exposure: Past    Smokeless tobacco: Never   Vaping Use    Vaping status: Never Used   Substance and Sexual Activity    Alcohol use: Not Currently     Comment: 3 times a week    Drug use: Never    Sexual activity: Not Currently     Partners: Male     Birth control/protection: Condom        family history includes Anxiety disorder in her mother; Arthritis in her mother; Cancer in her father; Depression in her father and mother; Diabetes in her mother; Early death in her mother; Heart disease in her mother; Hypertension in her mother; Mental illness in her mother; Thyroid disease in her father.     Review of Systems       Gait & Balance Assessment :  Risk assessment for falls. Fall precautions.  universal fall precautions, such as;   Using gait aids a cane, walker at the appropriate height at all times for ambulation or if necessary a wheelchair  Removing all area rugs and coffee tables to create a safe environment at home  Ensure clean, dry floors  Wearing supportive footwear and properly fitting clothing  Ensure bed/chair is appropriate height and patient's feet can touch the floor  Using a shower transfer bench  Using walk-in shower and having shower safety bars installed  Ensure proper lighting, minimize glare  Have nightlights operational and in use  Participation in an exercise program for gait training, balance training and strength  Avoid carrying laundry up  "and down steps  Ensure proper compliance and organization of medications to avoid errors   Avoid use of over the counter sedatives and alcohol consumption  Ensure easy access to call bell, glasses, TV control, telephone  Ensure glasses/hearing aids are in use or close by (on top of night table)     Shonetta Elaine Davis  reports that she has never smoked. She has been exposed to tobacco smoke. She has never used smokeless tobacco.      Body mass index is 50.42 kg/m².       Physical Examination:  /92 (BP Location: Right arm, Patient Position: Sitting, Cuff Size: Large Adult)   Ht 167.6 cm (66\")   Wt (!) 142 kg (312 lb 6.4 oz)   BMI 50.42 kg/m²    HEENT-wnl  Lungs-No wheezing or SOB  Patient is awake, alert and oriented.  Pupils 3 mm, equal and reactive.  Visual fields are full.  Face symmetric.  5/5 in the extremities.  Sensation intact.  Reflexes intact.  Gait normal    Radiological Data Review:  All neurological imaging studies were independently reviewed unless otherwise documented.          *Images from MRI dated 7/21/2022 show degenerative changes however the STIR hyperintense signal into the endplate and lower aspect of the L5 vertebral body has increased.    Assessment and Plan:  Diagnoses and all orders for this visit:    1. Degeneration of intervertebral disc of lumbar region with discogenic back pain (Primary)  -     Prolactin; Future  -     Comprehensive Metabolic Panel; Future  -     Sedimentation Rate; Future  -     XR Spine Lumbar Flex & Ext; Future    2. Fibromyalgia  -     Prolactin; Future  -     Comprehensive Metabolic Panel; Future  -     Sedimentation Rate; Future  -     XR Spine Lumbar Flex & Ext; Future    Other orders  -     XR outside films; Future  -     CT outside films; Future    Ms. Lundberg is a 30-year-old female with headaches, difficulties with memory recall, neck pain, thoracic pain, low back pain, numbness in the right foot.  She gets random numbness and tingling and clumsiness " in her hands and numbness and tingling in the feet.  She has seen neurology, Dr. Romero, for tremors and headaches, he felt that her symptoms were most likely due to her medications.  I did review the MRI of her brain showing a few punctate T2 hyperintensities. Xrays do not reveal instability. With her back pain, the MRI scan which shows increased Modic changes in the L5 vertebral body.  Sed rate was 31.  At this point with her BMI 50.71, she needs to be evaluated and treated for weight management.  I have not recommended surgery. She will f/u [rn.    Including assessment, review of prior documentation, review and interpretation of new and old diagnostic studies, discussing these findings with the patient and documentation, 30 minutes total time was spent on this appointment.    Any copied data from previous notes included in the (1) HPI, (2) PE, (3) MDM and/or assessment and plan has been reviewed and is accurate as of this date.    Sindi Median, HIGINIO    PCP:  Stai, Marshall Taylor, DO Shonetta Elaine Davis   1995   [id number]

## 2025-07-11 DIAGNOSIS — M25.50 CHRONIC JOINT PAIN: Primary | ICD-10-CM

## 2025-07-11 DIAGNOSIS — R76.8 POSITIVE ANA (ANTINUCLEAR ANTIBODY): ICD-10-CM

## 2025-07-11 DIAGNOSIS — G89.29 CHRONIC JOINT PAIN: Primary | ICD-10-CM

## 2025-07-11 DIAGNOSIS — M79.7 FIBROMYALGIA: ICD-10-CM

## 2025-07-15 ENCOUNTER — TELEPHONE (OUTPATIENT)
Dept: PSYCHIATRY | Facility: CLINIC | Age: 30
End: 2025-07-15

## 2025-07-29 RX ORDER — DULOXETIN HYDROCHLORIDE 30 MG/1
30 CAPSULE, DELAYED RELEASE ORAL DAILY
Qty: 60 CAPSULE | Refills: 0 | Status: SHIPPED | OUTPATIENT
Start: 2025-07-29

## 2025-08-07 ENCOUNTER — LAB (OUTPATIENT)
Facility: HOSPITAL | Age: 30
End: 2025-08-07
Payer: COMMERCIAL

## 2025-08-07 ENCOUNTER — HOSPITAL ENCOUNTER (EMERGENCY)
Facility: HOSPITAL | Age: 30
Discharge: HOME | End: 2025-08-07
Attending: EMERGENCY MEDICINE
Payer: COMMERCIAL

## 2025-08-07 ENCOUNTER — OFFICE VISIT (OUTPATIENT)
Age: 30
End: 2025-08-07
Payer: COMMERCIAL

## 2025-08-07 ENCOUNTER — APPOINTMENT (OUTPATIENT)
Dept: GENERAL RADIOLOGY | Facility: HOSPITAL | Age: 30
End: 2025-08-07
Payer: COMMERCIAL

## 2025-08-07 VITALS — BODY MASS INDEX: 47.09 KG/M2 | HEIGHT: 66 IN | WEIGHT: 293 LBS

## 2025-08-07 VITALS
RESPIRATION RATE: 18 BRPM | WEIGHT: 293 LBS | HEIGHT: 66 IN | DIASTOLIC BLOOD PRESSURE: 74 MMHG | TEMPERATURE: 98 F | SYSTOLIC BLOOD PRESSURE: 121 MMHG | HEART RATE: 91 BPM | OXYGEN SATURATION: 97 % | BODY MASS INDEX: 47.09 KG/M2

## 2025-08-07 DIAGNOSIS — R06.02 SHORTNESS OF BREATH: Primary | ICD-10-CM

## 2025-08-07 DIAGNOSIS — Z82.49 FAMILY HISTORY OF CARDIOVASCULAR DISEASE: ICD-10-CM

## 2025-08-07 DIAGNOSIS — D64.9 ANEMIA, UNSPECIFIED TYPE: ICD-10-CM

## 2025-08-07 DIAGNOSIS — M54.51 VERTEBROGENIC LOW BACK PAIN: ICD-10-CM

## 2025-08-07 DIAGNOSIS — Z51.81 THERAPEUTIC DRUG MONITORING: ICD-10-CM

## 2025-08-07 DIAGNOSIS — M79.7 FIBROMYALGIA: Primary | ICD-10-CM

## 2025-08-07 DIAGNOSIS — G89.4 CHRONIC PAIN SYNDROME: ICD-10-CM

## 2025-08-07 LAB
ALBUMIN SERPL-MCNC: 3.9 G/DL (ref 3.5–5.2)
ALBUMIN/GLOB SERPL: 1.2 G/DL
ALP SERPL-CCNC: 90 U/L (ref 39–117)
ALT SERPL W P-5'-P-CCNC: 14 U/L (ref 1–33)
AMPHET+METHAMPHET UR QL: NEGATIVE
AMPHETAMINES UR QL: NEGATIVE
ANION GAP SERPL CALCULATED.3IONS-SCNC: 9.3 MMOL/L (ref 5–15)
AST SERPL-CCNC: 19 U/L (ref 1–32)
BARBITURATES UR QL SCN: NEGATIVE
BASOPHILS # BLD AUTO: 0.05 10*3/MM3 (ref 0–0.2)
BASOPHILS NFR BLD AUTO: 0.7 % (ref 0–1.5)
BENZODIAZ UR QL SCN: NEGATIVE
BILIRUB SERPL-MCNC: <0.2 MG/DL (ref 0–1.2)
BUN SERPL-MCNC: 8.4 MG/DL (ref 6–20)
BUN/CREAT SERPL: 11.8 (ref 7–25)
BUPRENORPHINE SERPL-MCNC: NEGATIVE NG/ML
CALCIUM SPEC-SCNC: 9.2 MG/DL (ref 8.6–10.5)
CANNABINOIDS SERPL QL: POSITIVE
CHLORIDE SERPL-SCNC: 107 MMOL/L (ref 98–107)
CO2 SERPL-SCNC: 25.7 MMOL/L (ref 22–29)
COCAINE UR QL: NEGATIVE
CREAT SERPL-MCNC: 0.71 MG/DL (ref 0.57–1)
D DIMER PPP FEU-MCNC: <0.27 MCGFEU/ML (ref 0–0.5)
DEPRECATED RDW RBC AUTO: 43 FL (ref 37–54)
EGFRCR SERPLBLD CKD-EPI 2021: 116.7 ML/MIN/1.73
EOSINOPHIL # BLD AUTO: 0.14 10*3/MM3 (ref 0–0.4)
EOSINOPHIL NFR BLD AUTO: 2.1 % (ref 0.3–6.2)
ERYTHROCYTE [DISTWIDTH] IN BLOOD BY AUTOMATED COUNT: 15.4 % (ref 12.3–15.4)
FENTANYL UR-MCNC: NEGATIVE NG/ML
GLOBULIN UR ELPH-MCNC: 3.2 GM/DL
GLUCOSE SERPL-MCNC: 121 MG/DL (ref 65–99)
HCT VFR BLD AUTO: 35.1 % (ref 34–46.6)
HGB BLD-MCNC: 10.5 G/DL (ref 12–15.9)
IMM GRANULOCYTES # BLD AUTO: 0.01 10*3/MM3 (ref 0–0.05)
IMM GRANULOCYTES NFR BLD AUTO: 0.1 % (ref 0–0.5)
LYMPHOCYTES # BLD AUTO: 2.31 10*3/MM3 (ref 0.7–3.1)
LYMPHOCYTES NFR BLD AUTO: 34.2 % (ref 19.6–45.3)
MCH RBC QN AUTO: 23.2 PG (ref 26.6–33)
MCHC RBC AUTO-ENTMCNC: 29.9 G/DL (ref 31.5–35.7)
MCV RBC AUTO: 77.7 FL (ref 79–97)
METHADONE UR QL SCN: NEGATIVE
MONOCYTES # BLD AUTO: 0.43 10*3/MM3 (ref 0.1–0.9)
MONOCYTES NFR BLD AUTO: 6.4 % (ref 5–12)
NEUTROPHILS NFR BLD AUTO: 3.81 10*3/MM3 (ref 1.7–7)
NEUTROPHILS NFR BLD AUTO: 56.5 % (ref 42.7–76)
NRBC BLD AUTO-RTO: 0 /100 WBC (ref 0–0.2)
NT-PROBNP SERPL-MCNC: <36 PG/ML (ref 0–450)
OPIATES UR QL: NEGATIVE
OXYCODONE UR QL SCN: NEGATIVE
PCP UR QL SCN: NEGATIVE
PLATELET # BLD AUTO: 285 10*3/MM3 (ref 140–450)
PMV BLD AUTO: 9.7 FL (ref 6–12)
POTASSIUM SERPL-SCNC: 3.9 MMOL/L (ref 3.5–5.2)
PROT SERPL-MCNC: 7.1 G/DL (ref 6–8.5)
QT INTERVAL: 344 MS
QTC INTERVAL: 430 MS
RBC # BLD AUTO: 4.52 10*6/MM3 (ref 3.77–5.28)
SODIUM SERPL-SCNC: 142 MMOL/L (ref 136–145)
TRICYCLICS UR QL SCN: NEGATIVE
TROPONIN T SERPL HS-MCNC: <6 NG/L
WBC NRBC COR # BLD AUTO: 6.75 10*3/MM3 (ref 3.4–10.8)

## 2025-08-07 PROCEDURE — 85379 FIBRIN DEGRADATION QUANT: CPT | Performed by: EMERGENCY MEDICINE

## 2025-08-07 PROCEDURE — 83880 ASSAY OF NATRIURETIC PEPTIDE: CPT | Performed by: EMERGENCY MEDICINE

## 2025-08-07 PROCEDURE — 93005 ELECTROCARDIOGRAM TRACING: CPT | Performed by: EMERGENCY MEDICINE

## 2025-08-07 PROCEDURE — 84484 ASSAY OF TROPONIN QUANT: CPT | Performed by: EMERGENCY MEDICINE

## 2025-08-07 PROCEDURE — 80053 COMPREHEN METABOLIC PANEL: CPT | Performed by: EMERGENCY MEDICINE

## 2025-08-07 PROCEDURE — 80307 DRUG TEST PRSMV CHEM ANLYZR: CPT

## 2025-08-07 PROCEDURE — 99284 EMERGENCY DEPT VISIT MOD MDM: CPT

## 2025-08-07 PROCEDURE — 85025 COMPLETE CBC W/AUTO DIFF WBC: CPT | Performed by: EMERGENCY MEDICINE

## 2025-08-07 PROCEDURE — 71045 X-RAY EXAM CHEST 1 VIEW: CPT

## 2025-08-07 PROCEDURE — 36415 COLL VENOUS BLD VENIPUNCTURE: CPT

## 2025-08-26 LAB
QT INTERVAL: 344 MS
QTC INTERVAL: 430 MS